# Patient Record
Sex: MALE | Race: WHITE | NOT HISPANIC OR LATINO | Employment: FULL TIME | URBAN - METROPOLITAN AREA
[De-identification: names, ages, dates, MRNs, and addresses within clinical notes are randomized per-mention and may not be internally consistent; named-entity substitution may affect disease eponyms.]

---

## 2017-01-04 ENCOUNTER — MYC REFILL (OUTPATIENT)
Dept: FAMILY MEDICINE | Facility: CLINIC | Age: 52
End: 2017-01-04

## 2017-01-04 DIAGNOSIS — R39.198 ABNORMALITY OF URINATION: ICD-10-CM

## 2017-01-04 RX ORDER — TAMSULOSIN HYDROCHLORIDE 0.4 MG/1
0.4 CAPSULE ORAL DAILY
Qty: 30 CAPSULE | Refills: 1 | Status: SHIPPED | OUTPATIENT
Start: 2017-01-04 | End: 2017-04-13

## 2017-01-04 NOTE — TELEPHONE ENCOUNTER
Prescription approved per [facility] Refill Protocol.    Mac Mccartney RN, BSN

## 2017-01-04 NOTE — TELEPHONE ENCOUNTER
Flomax         Last Written Prescription Date: 11/09/16  Last Fill Quantity: 30, # refills: 1    Last Office Visit with [facility], SAMANTHAP or [facility] prescribing provider:  11/09/16   Future Office Visit:    Next 5 appointments (look out 90 days)     Jan 05, 2017 10:45 AM   [facility] with [facility]ly [facility]ates [facility]   [facility]    [address] [place] [address] [phone]                  BP Readings from Last 3 Encounters:   11/09/16 122/84   10/25/16 112/80   09/15/15 113/71

## 2017-01-04 NOTE — TELEPHONE ENCOUNTER
Message from [employer]:  Original authorizing provider: Kadeem You MD, MD Kaz Moralez would like a refill of the following medications:  tamsulosin (FLOMAX) 0.4 MG 24 hr capsule [Kadeem You MD, MD]    Preferred pharmacy: [facility] [address] [place] [address]    Comment:  Dr. You only gave me a 2 month supply and I am nearly out. Please renew the prescription and if possible set it up so I can order it in 3 month supply (it's less expensive at the pharmacy that way.) Thank you

## 2017-01-05 ENCOUNTER — OFFICE VISIT (OUTPATIENT)
Dept: DERMATOLOGY | Facility: CLINIC | Age: 52
End: 2017-01-05
Payer: COMMERCIAL

## 2017-01-05 DIAGNOSIS — Z80.8 FAMILY HISTORY OF MELANOMA: ICD-10-CM

## 2017-01-05 DIAGNOSIS — B07.0 PLANTAR WART: ICD-10-CM

## 2017-01-05 DIAGNOSIS — L57.0 ACTINIC KERATOSIS: ICD-10-CM

## 2017-01-05 DIAGNOSIS — Z85.828 HISTORY OF NONMELANOMA SKIN CANCER: Primary | ICD-10-CM

## 2017-01-05 PROCEDURE — 17003 DESTRUCT PREMALG LES 2-14: CPT | Performed by: DERMATOLOGY

## 2017-01-05 PROCEDURE — 17110 DESTRUCTION B9 LES UP TO 14: CPT | Performed by: DERMATOLOGY

## 2017-01-05 PROCEDURE — 99213 OFFICE O/P EST LOW 20 MIN: CPT | Mod: 25 | Performed by: DERMATOLOGY

## 2017-01-05 PROCEDURE — 17000 DESTRUCT PREMALG LESION: CPT | Mod: 59 | Performed by: DERMATOLOGY

## 2017-01-05 NOTE — PATIENT INSTRUCTIONS
Cryotherapy    What is it?    Use of a very cold liquid, such as liquid nitrogen, to freeze and destroy abnormal skin cells that need to be removed    What should I expect?    Tenderness and redness    A small blister that might grow and fill with dark purple blood. There may be crusting.    More than one treatment may be needed if the lesions do not go away.    How do I care for the treated area?    Gently wash the area with your hands when bathing.    Use a thin layer of Vaseline to help with healing. You may use a Band-Aid.     The area should heal within 7-10 days and may leave behind a pink or lighter color.     Do not use an antibiotic or Neosporin ointment.     You may take acetaminophen (Tylenol) for pain.     Call your Doctor if you have:    Severe pain    Signs of infection (warmth, redness, cloudy yellow drainage, and or a bad smell)    Questions or concerns    Who should I call with questions?       [facility]: [phone]       [facility]: [phone]       For urgent needs outside of business hours call the [facility] at [phone]        and ask for the dermatology resident on call    Topical Salicylic Acid Treatment    What is this medicine used for?    Treatment of warts at home    Brand name and generic salicylic acid products are available over the counter    The products are available as liquids or sticky patches    Some brand names are: Duofilm , Mediplast , Dr. Priscilla newby , and Occlusol      When can I start using topical salicylic acid?    If you had treatment of your warts in clinic today wait about 1 week. The irritation or soreness will be gone.    Use the salicylic acid today if your warts were not treated in clinic.    How do I use topical salicylic acid?    Soak warts in warm water for 5 to 10 minutes. You may use your daily shower to do this.    Pat the area dry with a towel.    You need to remove the dead skin  over the warts. Gently use a pumice stone or emery board to remove the dead skin. Do not do this to the point of discomfort or bleeding    Note: Do not use the emery board or pumice stone for anything else. There is a chance of spreading the virus that causes warts.     Put 1 drop of the liquid or place a patch on each wart. Try not to get the medicine on the surrounding skin. Cover the treated areas with strong tape, or you can use Dr. Priscilla newby  or other brand moleskin    Soak, remove dead skin, and apply the salicylic acid each day    If you have skin irritation, skip days in between treatments until the irritation resolves.  Then start the treatment again.

## 2017-01-05 NOTE — NURSING NOTE
Dermatology Rooming Note    Kaz Moralez's goals for this visit include:   Chief Complaint   Patient presents with     Derm Problem     skin recheck. has some rough patches on his temple area.        Is a scribe okay for this visit:YES    Are records needed for this visit(If yes, obtain release of information): YES     Vitals: There were no vitals taken for this visit.    Referring Provider:  No referring provider defined for this encounter.

## 2017-01-05 NOTE — MR AVS SNAPSHOT
After Visit Summary   1/5/2017    Kaz Moralez    MRN: [id number]           Patient Information     Date Of Birth          1965        Visit Information        Provider Department      1/5/2017 10:45 AM Ashely Yates MD [facility]        Today's Diagnoses     History of nonmelanoma skin cancer    -  1     Actinic keratosis         Family history of melanoma         Plantar wart           Care Instructions    Cryotherapy    What is it?    Use of a very cold liquid, such as liquid nitrogen, to freeze and destroy abnormal skin cells that need to be removed    What should I expect?    Tenderness and redness    A small blister that might grow and fill with dark purple blood. There may be crusting.    More than one treatment may be needed if the lesions do not go away.    How do I care for the treated area?    Gently wash the area with your hands when bathing.    Use a thin layer of Vaseline to help with healing. You may use a Band-Aid.     The area should heal within 7-10 days and may leave behind a pink or lighter color.     Do not use an antibiotic or Neosporin ointment.     You may take acetaminophen (Tylenol) for pain.     Call your Doctor if you have:    Severe pain    Signs of infection (warmth, redness, cloudy yellow drainage, and or a bad smell)    Questions or concerns    Who should I call with questions?       [facility]: [phone]       [facility]: [phone]       For urgent needs outside of business hours call the [facility] at [phone]        and ask for the dermatology resident on call    Topical Salicylic Acid Treatment    What is this medicine used for?    Treatment of warts at home    Brand name and generic salicylic acid products are available over the counter    The products are available as liquids or sticky patches    Some brand names are: Duofilm , Mediplast , Dr. Priscilla newby , and  Occlusol      When can I start using topical salicylic acid?    If you had treatment of your warts in clinic today wait about 1 week. The irritation or soreness will be gone.    Use the salicylic acid today if your warts were not treated in clinic.    How do I use topical salicylic acid?    Soak warts in warm water for 5 to 10 minutes. You may use your daily shower to do this.    Pat the area dry with a towel.    You need to remove the dead skin over the warts. Gently use a pumice stone or emery board to remove the dead skin. Do not do this to the point of discomfort or bleeding    Note: Do not use the emery board or pumice stone for anything else. There is a chance of spreading the virus that causes warts.     Put 1 drop of the liquid or place a patch on each wart. Try not to get the medicine on the surrounding skin. Cover the treated areas with strong tape, or you can use Dr. Priscilla newby  or other brand moleskin    Soak, remove dead skin, and apply the salicylic acid each day    If you have skin irritation, skip days in between treatments until the irritation resolves.  Then start the treatment again.            Follow-ups after your visit        Your next 10 appointments already scheduled     Jan 05, 2018  2:15 PM   Return Visit with Ashely Yates MD   [facility]    [address] [place] [address] [phone]              Who to contact     If you have questions or need follow up information about today's clinic visit or your schedule please contact [facility] directly at [phone].  Normal or non-critical lab and imaging results will be communicated to you by MyChart, letter or phone within 4 business days after the clinic has received the results. If you do not hear from us within 7 days, please contact the clinic through MyChart or phone. If you have a critical or abnormal lab result, we will notify you by phone as soon as  possible.  Submit refill requests through [employer] or call your pharmacy and they will forward the refill request to us. Please allow 3 business days for your refill to be completed.          Additional Information About Your Visit        [employer] Information     [employer] gives you secure access to your electronic health record. If you see a primary care provider, you can also send messages to your care team and make appointments. If you have questions, please call your primary care clinic.  If you do not have a primary care provider, please call [phone] and they will assist you.      [employer] is an electronic gateway that provides easy, online access to your medical records. With [employer], you can request a clinic appointment, read your test results, renew a prescription or communicate with your care team.     To access your existing account, please contact your [facility] or call [phone] for assistance.        Care EveryWhere ID     This is your Care EveryWhere ID. This could be used by other organizations to access your [place] medical records  [id number]         Blood Pressure from Last 3 Encounters:   11/09/16 122/84   10/25/16 112/80   09/15/15 113/71    Weight from Last 3 Encounters:   11/09/16 193 lb (87.544 kg)   10/25/16 193 lb (87.544 kg)   07/06/15 193 lb (87.544 kg)              We Performed the Following     DESTRUCT BENIGN LESION, UP TO 14     DESTRUCT PREMALIGNANT LESION, 2-14     DESTRUCT PREMALIGNANT LESION, FIRST        Primary Care Provider    None Doctor, MD       No address on file        Thank you!     Thank you for choosing [facility]  for your care. Our goal is always to provide you with excellent care. Hearing back from our patients is one way we can continue to improve our services. Please take a few minutes to complete the written survey that you may receive in the mail after your visit with us. Thank you!             Your Updated  Medication List - Protect others around you: Learn how to safely use, store and throw away your medicines at www.disposemymeds.org.          This list is accurate as of: 1/5/17 11:14 AM.  Always use your most recent med list.                   Brand Name Dispense Instructions for use    GLUCOSAMINE CHONDROITIN JOINT Tabs      Take by mouth daily       IBUPROFEN PO      Take 400 mg by mouth       tamsulosin 0.4 MG capsule    FLOMAX    30 capsule    Take 1 capsule (0.4 mg) by mouth daily       timolol 0.25 % ophthalmic solution    TIMOPTIC     Place 1 drop into both eyes every morning

## 2017-01-05 NOTE — PROGRESS NOTES
"[facility] Dermatology Note      Dermatology Problem List:  1. Family history of melanoma  2, BCC, right posterior shoulder  -s/p ED&C 9/15/2015  3. Actinic keratosis  -s/p cryotherapy    Encounter Date: Jan 5, 2017    CC:  Chief Complaint   Patient presents with     Derm Problem     skin recheck. has some rough patches on his temple area.          History of Present Illness:  Mr. Kaz Moralez is a 51 year old male who presents as a follow-up for history of BCC and family history of melanoma. The patient was last seen 3/3/2016 when 6 AKs were treated with cryotherpay. Today, the patient reports a wart on the foot that he has had for a few months. He believes he picked it up after being barefoot in karate class. He has been using oregano oil on the area. History of pain in the area. Also reports a scaly area on the left temple. The patient reports no other lesions of concern.      Past Medical History:   Patient Active Problem List   Diagnosis     CARDIOVASCULAR SCREENING; LDL GOAL LESS THAN 160     Glaucoma     Past Medical History   Diagnosis Date     Unspecified glaucoma(365.9)      Glaucoma     No past surgical history on file.    Social History:  The patient works as an .     Family History:  The patient reports a family history of melanoma and nonmelanoma skin cancer.    Medications:  Current Outpatient Prescriptions   Medication Sig Dispense Refill     tamsulosin (FLOMAX) 0.4 MG capsule Take 1 capsule (0.4 mg) by mouth daily 30 capsule 1     Glucos-Chondroit-Hyaluron-MSM (GLUCOSAMINE CHONDROITIN JOINT) TABS Take by mouth daily       IBUPROFEN PO Take 400 mg by mouth       timolol (TIMOPTIC) 0.25 % ophthalmic solution Place 1 drop into both eyes every morning  0     Allergies   Allergen Reactions     Penicillins      \"Reaction when I had the mumps.\" Reports paralysis on R-side of body, \"couldn't walk for a week.\" Reports that \"to be safe\" doctor advised to avoid in future. "     Review of Systems:  -Skin: As above in HPI. No additional skin concerns.  -Const: The patient is generally feeling well today.     Physical exam:  There were no vitals taken for this visit.  GEN: This is a well developed, well-nourished male in no acute distress, in a pleasant mood.    SKIN: Total skin excluding the undergarment areas was performed. The exam included the head/face, neck, both arms, chest, back, abdomen, both legs, digits and/or nails. Including exam of the buttocks. Declines genital exam.   -There is a well healed surgical scar without erythema, nodularity or telangiectasias on the right posterior shoudler.   -There are erythematous macules with overyling adherent scale on the bilateral gemples.   -There is one  verrucous papule with thrombosed capillaries interrupting dermatoglyphics on the right plantar foot.3mmm  -No other lesions of concern on areas examined.     Impression/Plan:  1. Family history of melanoma  2. History of nonmelanoma skin cancer, no clincial evidence of recurrence:  Recommend sunscreens SPF #30 or greater, protective clothing and avoidance of tanning beds.  3. Actinic keratosis, bilateral temples  Cryotherapy procedure note: After verbal consent and discussion of risks and benefits including but no limited to dyspigmentation/scar, blister, and pain, 4 were treated with 1-2mm freeze border for 1 cycle with liquid nitrogen. Post cryotherapy instructions were provided.  4. Verruca plantaris, right plantar foot. History of pain.     Cryotherapy procedure note: After verbal consent and discussion of risks and benefits including but no limited to dyspigmentation/scar, blister, and pain, 1 was pared with a 15 blade then treated with 1-2mm freeze border for 2 cycles with liquid nitrogen. Post cryotherapy instructions were provided.     Wait 1 week, then may begin OTC treatments. Handout provided to patient.     Follow up in 1 year for skin check, 1 month for wart, earlier for new  or changing lesions.     Staff Involved:  Scribe/Staff    Scribe Disclosure:   I, Razia Moreland, am serving as a scribe to document services personally performed by Dr. Ashely Yates, based on data collection and the provider's statements to me.     Provider Disclosure:   I agree with above History, Review of Systems, Physical exam and Plan. I have reviewed the content of the documentation and have edited it as needed. I have personally performed the services documented here and the documentation accurately represents those services and the decisions I have made.     Ashely Yates MD    Department of Dermatology  [facility]: Phone: [phone], Fax:[phone]  [facility]: Phone: [phone], Fax: [phone]

## 2017-04-13 ENCOUNTER — MYC MEDICAL ADVICE (OUTPATIENT)
Dept: FAMILY MEDICINE | Facility: CLINIC | Age: 52
End: 2017-04-13

## 2017-04-13 DIAGNOSIS — R39.198 ABNORMALITY OF URINATION: ICD-10-CM

## 2017-04-13 RX ORDER — TAMSULOSIN HYDROCHLORIDE 0.4 MG/1
0.4 CAPSULE ORAL DAILY
Qty: 90 CAPSULE | Refills: 3 | Status: SHIPPED | OUTPATIENT
Start: 2017-04-13 | End: 2018-07-09

## 2017-05-04 ENCOUNTER — OFFICE VISIT (OUTPATIENT)
Dept: FAMILY MEDICINE | Facility: CLINIC | Age: 52
End: 2017-05-04
Payer: COMMERCIAL

## 2017-05-04 VITALS
WEIGHT: 194.5 LBS | BODY MASS INDEX: 27.23 KG/M2 | OXYGEN SATURATION: 99 % | HEART RATE: 74 BPM | HEIGHT: 71 IN | SYSTOLIC BLOOD PRESSURE: 124 MMHG | TEMPERATURE: 97.7 F | RESPIRATION RATE: 14 BRPM | DIASTOLIC BLOOD PRESSURE: 78 MMHG

## 2017-05-04 DIAGNOSIS — Z11.59 NEED FOR HEPATITIS C SCREENING TEST: ICD-10-CM

## 2017-05-04 DIAGNOSIS — Z13.6 CARDIOVASCULAR SCREENING; LDL GOAL LESS THAN 160: ICD-10-CM

## 2017-05-04 DIAGNOSIS — Z00.00 ROUTINE GENERAL MEDICAL EXAMINATION AT A HEALTH CARE FACILITY: Primary | ICD-10-CM

## 2017-05-04 DIAGNOSIS — M25.511 RIGHT SHOULDER PAIN, UNSPECIFIED CHRONICITY: ICD-10-CM

## 2017-05-04 LAB
CHOLEST SERPL-MCNC: 188 MG/DL
HCV AB SERPL QL IA: NORMAL
HDLC SERPL-MCNC: 47 MG/DL
LDLC SERPL CALC-MCNC: 116 MG/DL
NONHDLC SERPL-MCNC: 141 MG/DL
TRIGL SERPL-MCNC: 126 MG/DL

## 2017-05-04 PROCEDURE — 86803 HEPATITIS C AB TEST: CPT | Performed by: FAMILY MEDICINE

## 2017-05-04 PROCEDURE — 80061 LIPID PANEL: CPT | Performed by: FAMILY MEDICINE

## 2017-05-04 PROCEDURE — 36415 COLL VENOUS BLD VENIPUNCTURE: CPT | Performed by: FAMILY MEDICINE

## 2017-05-04 PROCEDURE — 99396 PREV VISIT EST AGE 40-64: CPT | Performed by: FAMILY MEDICINE

## 2017-05-04 ASSESSMENT — PAIN SCALES - GENERAL: PAINLEVEL: NO PAIN (0)

## 2017-05-04 NOTE — NURSING NOTE
"Chief Complaint   Patient presents with     Physical       Initial /78 (BP Location: Right arm, Patient Position: Chair, Cuff Size: Adult Regular)  Pulse 74  Temp 97.7  F (36.5  C) (Tympanic)  Resp 14  Ht 5' 11\" (1.803 m)  Wt 194 lb 8 oz (88.2 kg)  SpO2 99%  BMI 27.13 kg/m2 Estimated body mass index is 27.13 kg/(m^2) as calculated from the following:    Height as of this encounter: 5' 11\" (1.803 m).    Weight as of this encounter: 194 lb 8 oz (88.2 kg).  Medication Reconciliation: complete   Health Maintenance Due   Topic Date Due     HEPATITIS C SCREENING  05/24/1981     Aniya Del Valle, [facility]      "

## 2017-05-04 NOTE — MR AVS SNAPSHOT
After Visit Summary   5/4/2017    Kaz Moralez    MRN: [id number]           Patient Information     Date Of Birth          1965        Visit Information        Provider Department      5/4/2017 8:20 AM Kadeem You MD [facility]        Today's Diagnoses     Routine general medical examination at a health care facility    -  1    Need for hepatitis C screening test        CARDIOVASCULAR SCREENING; LDL GOAL LESS THAN 160        Right shoulder pain, unspecified chronicity          Care Instructions      Preventive Health Recommendations  Male Ages 50 - 64    Yearly exam:             See your health care provider every year in order to  o   Review health changes.   o   Discuss preventive care.    o   Review your medicines if your doctor has prescribed any.     Have a cholesterol test every 5 years, or more frequently if you are at risk for high cholesterol/heart disease.     Have a diabetes test (fasting glucose) every three years. If you are at risk for diabetes, you should have this test more often.     Have a colonoscopy at age 50, or have a yearly FIT test (stool test). These exams will check for colon cancer.      Talk with your health care provider about whether or not a prostate cancer screening test (PSA) is right for you.    You should be tested each year for STDs (sexually transmitted diseases), if you re at risk.     Shots: Get a flu shot each year. Get a tetanus shot every 10 years.     Nutrition:    Eat at least 5 servings of fruits and vegetables daily.     Eat whole-grain bread, whole-wheat pasta and brown rice instead of white grains and rice.     Talk to your provider about Calcium and Vitamin D.     Lifestyle    Exercise for at least 150 minutes a week (30 minutes a day, 5 days a week). This will help you control your weight and prevent disease.     Limit alcohol to one drink per day.     No smoking.     Wear sunscreen to prevent skin cancer.     See your  dentist every six months for an exam and cleaning.     See your eye doctor every 1 to 2 years.          Follow-ups after your visit        Additional Services     ORTHOPEDICS ADULT REFERRAL       Your provider has referred you to: [facility]: [facility] [phone]   [email or web address]    Please be aware that coverage of these services is subject to the terms and limitations of your health insurance plan.  Call member services at your health plan with any benefit or coverage questions.      Please bring the following to your appointment:    >>   Any x-rays, CTs or MRIs which have been performed.  Contact the facility where they were done to arrange for  prior to your scheduled appointment.    >>   List of current medications   >>   This referral request   >>   Any documents/labs given to you for this referral                  Your next 10 appointments already scheduled     May 08, 2017  9:30 AM CDT   New Visit with Esmer Person MD   [facility]    [address] [facility] [address] [phone]            Jan 05, 2018  2:15 PM CST   [facility]    [address] [place] [address] [phone]              Who to contact     If you have questions or need follow up information about today's clinic visit or your schedule please contact [facility] directly at [phone].  Normal or non-critical lab and imaging results will be communicated to you by MyChart, letter or phone within 4 business days after the clinic has received the results. If you do not hear from us within 7 days, please contact the clinic through MyChart or phone. If you have a critical or abnormal lab result, we will notify you by phone as soon as possible.  Submit refill requests through [employer] or call your  "pharmacy and they will forward the refill request to us. Please allow 3 business days for your refill to be completed.          Additional Information About Your Visit        [employer] Information     [employer] gives you secure access to your electronic health record. If you see a primary care provider, you can also send messages to your care team and make appointments. If you have questions, please call your primary care clinic.  If you do not have a primary care provider, please call [phone] and they will assist you.        Care EveryWhere ID     This is your Care EveryWhere ID. This could be used by other organizations to access your [place] medical records  [id number]        Your Vitals Were     Pulse Temperature Respirations Height Pulse Oximetry BMI (Body Mass Index)    74 97.7  F (36.5  C) (Tympanic) 14 5' 11\" (1.803 m) 99% 27.13 kg/m2       Blood Pressure from Last 3 Encounters:   05/04/17 124/78   11/09/16 122/84   10/25/16 112/80    Weight from Last 3 Encounters:   05/04/17 194 lb 8 oz (88.2 kg)   11/09/16 193 lb (87.5 kg)   10/25/16 193 lb (87.5 kg)              We Performed the Following     Hepatitis C Screen Reflex to HCV RNA Quant and Genotype     LIPID REFLEX TO DIRECT LDL PANEL     ORTHOPEDICS ADULT REFERRAL        Primary Care Provider    None Doctor, MD       No address on file        Thank you!     Thank you for choosing [facility]  for your care. Our goal is always to provide you with excellent care. Hearing back from our patients is one way we can continue to improve our services. Please take a few minutes to complete the written survey that you may receive in the mail after your visit with us. Thank you!             Your Updated Medication List - Protect others around you: Learn how to safely use, store and throw away your medicines at www.disposemymeds.org.          This list is accurate as of: 5/4/17  8:53 AM.  Always use your most recent med list.                   Brand " Name Dispense Instructions for use    GLUCOSAMINE CHONDROITIN JOINT Tabs      Take by mouth daily       IBUPROFEN PO      Take 400 mg by mouth       tamsulosin 0.4 MG capsule    FLOMAX    90 capsule    Take 1 capsule (0.4 mg) by mouth daily       timolol 0.25 % ophthalmic solution    TIMOPTIC     Place 1 drop into both eyes every morning

## 2017-05-04 NOTE — PROGRESS NOTES
SUBJECTIVE:     CC: Kaz Moralez is an 51 year old male who presents for preventative health visit.     Healthy Habits:    Do you get at least three servings of calcium containing foods daily (dairy, green leafy vegetables, etc.)? yes    Amount of exercise or daily activities, outside of work: 7 day(s) per week, 60+ min per day    Problems taking medications regularly No    Medication side effects: Yes - Timilol (eye drops)  causes intense dreams    Have you had an eye exam in the past two years? yes    Do you see a dentist twice per year? yes    Do you have sleep apnea, excessive snoring or daytime drowsiness?some daytime drowsiness, he has a deviated septum        He had a rt shoulder injury y-16 and is having recurring pain.   He wants to address his extra 20 lbs.     Today's PHQ-2 Score:   PHQ-2 ( 1999 [employer]) 6/26/2015   Q1: Little interest or pleasure in doing things 0   Q2: Feeling down, depressed or hopeless 0   PHQ-2 Score 0       Abuse: Current or Past(Physical, Sexual or Emotional)- No  Do you feel safe in your environment - Yes    Social History   Substance Use Topics     Smoking status: Never Smoker     Smokeless tobacco: Never Used     Alcohol use 1.2 oz/week     2 Standard drinks or equivalent per week      Comment: wine 2 x week     The patient does not drink >3 drinks per day nor >7 drinks per week.    Last PSA:   PSA   Date Value Ref Range Status   11/09/2016 2.07 0 - 4 ug/L Final     Comment:     Assay Method:  Chemiluminescence using Siemens Vista analyzer       Recent Labs   Lab Test  06/26/15   1001  03/29/11   0827   CHOL  170  172   HDL  40*  37*   LDL  108  106   TRIG  109  145   CHOLHDLRATIO  4.2  4.7       Reviewed orders with patient. Reviewed health maintenance and updated orders accordingly - Yes    Reviewed and updated as needed this visit by clinical staff         Reviewed and updated as needed this visit by Provider            ROS:  C: NEGATIVE for fever, chills, change in  "weight  I: NEGATIVE for worrisome rashes, moles or lesions  E: NEGATIVE for vision changes or irritation  ENT: NEGATIVE for ear, mouth and throat problems  R: NEGATIVE for significant cough or SOB  CV: NEGATIVE for chest pain, palpitations or peripheral edema  GI: NEGATIVE for nausea, abdominal pain, heartburn, or change in bowel habits   male: negative for dysuria, hematuria, decreased urinary stream, erectile dysfunction, urethral discharge  MUSCULOSKELETAL:R shoulder pain now for many years getting worse remembers an acute injury sudden pain when trying to bench press 300 lbs.      N: NEGATIVE for weakness, dizziness or paresthesias  P: NEGATIVE for changes in mood or affect    BP Readings from Last 3 Encounters:   05/04/17 124/78   11/09/16 122/84   10/25/16 112/80    Wt Readings from Last 3 Encounters:   05/04/17 194 lb 8 oz (88.2 kg)   11/09/16 193 lb (87.5 kg)   10/25/16 193 lb (87.5 kg)                  Current Outpatient Prescriptions   Medication Sig Dispense Refill     tamsulosin (FLOMAX) 0.4 MG capsule Take 1 capsule (0.4 mg) by mouth daily 90 capsule 3     Glucos-Chondroit-Hyaluron-MSM (GLUCOSAMINE CHONDROITIN JOINT) TABS Take by mouth daily       IBUPROFEN PO Take 400 mg by mouth       timolol (TIMOPTIC) 0.25 % ophthalmic solution Place 1 drop into both eyes every morning  0     OBJECTIVE:     /78 (BP Location: Right arm, Patient Position: Chair, Cuff Size: Adult Regular)  Pulse 74  Temp 97.7  F (36.5  C) (Tympanic)  Resp 14  Ht 5' 11\" (1.803 m)  Wt 194 lb 8 oz (88.2 kg)  SpO2 99%  BMI 27.13 kg/m2  EXAM:  GENERAL: healthy, alert and no distress  NECK: no adenopathy, no asymmetry, masses, or scars and thyroid normal to palpation  RESP: lungs clear to auscultation - no rales, rhonchi or wheezes  CV: regular rate and rhythm, normal S1 S2, no S3 or S4, no murmur, click or rub, no peripheral edema and peripheral pulses strong  ABDOMEN: soft, nontender, no hepatosplenomegaly, no masses and " "bowel sounds normal  MS: RUE exam shows pain with abduction to the side at 90 degrees with any pronation or supination, intense pain deep in the shoulder.    NEURO: Normal strength and tone, mentation intact and speech normal  PSYCH: mentation appears normal, affect normal/bright    ASSESSMENT/PLAN:     1. Routine general medical examination at a health care facility      2. Need for hepatitis C screening test    - Hepatitis C Screen Reflex to HCV RNA Quant and Genotype    3. CARDIOVASCULAR SCREENING; LDL GOAL LESS THAN 160    - LIPID REFLEX TO DIRECT LDL PANEL    4. Right shoulder pain, unspecified chronicity    - ORTHOPEDICS ADULT REFERRAL    COUNSELING:  Reviewed preventive health counseling, as reflected in patient instructions       Regular exercise       Healthy diet/nutrition         reports that he has never smoked. He has never used smokeless tobacco.    Estimated body mass index is 26.99 kg/(m^2) as calculated from the following:    Height as of 11/9/16: 5' 10.9\" (1.801 m).    Weight as of 11/9/16: 193 lb (87.5 kg).       Counseling Resources:  ATP IV Guidelines  Pooled Cohorts Equation Calculator  FRAX Risk Assessment  ICSI Preventive Guidelines  Dietary Guidelines for Americans, 2010  USDA's MyPlate  ASA Prophylaxis  Lung CA Screening    Kadeem You MD  [facility]  "

## 2017-05-08 ENCOUNTER — RADIANT APPOINTMENT (OUTPATIENT)
Dept: GENERAL RADIOLOGY | Facility: CLINIC | Age: 52
End: 2017-05-08
Attending: ORTHOPAEDIC SURGERY
Payer: COMMERCIAL

## 2017-05-08 ENCOUNTER — OFFICE VISIT (OUTPATIENT)
Dept: ORTHOPEDICS | Facility: CLINIC | Age: 52
End: 2017-05-08
Payer: COMMERCIAL

## 2017-05-08 VITALS — TEMPERATURE: 97.2 F | HEIGHT: 71 IN | WEIGHT: 194 LBS | BODY MASS INDEX: 27.16 KG/M2

## 2017-05-08 DIAGNOSIS — M75.31 CALCIFIC TENDINITIS OF RIGHT SHOULDER: Primary | ICD-10-CM

## 2017-05-08 DIAGNOSIS — M25.519 SHOULDER PAIN: ICD-10-CM

## 2017-05-08 PROCEDURE — 99244 OFF/OP CNSLTJ NEW/EST MOD 40: CPT | Mod: 25 | Performed by: ORTHOPAEDIC SURGERY

## 2017-05-08 PROCEDURE — 20610 DRAIN/INJ JOINT/BURSA W/O US: CPT | Mod: RT | Performed by: ORTHOPAEDIC SURGERY

## 2017-05-08 PROCEDURE — 73030 X-RAY EXAM OF SHOULDER: CPT | Mod: TC

## 2017-05-08 NOTE — NURSING NOTE
"Chief Complaint   Patient presents with     Consult     rt shoulder pain for about 18 years        Initial Temp 97.2  F (36.2  C)  Ht 1.803 m (5' 11\")  Wt 88 kg (194 lb)  BMI 27.06 kg/m2 Estimated body mass index is 27.06 kg/(m^2) as calculated from the following:    Height as of this encounter: 1.803 m (5' 11\").    Weight as of this encounter: 88 kg (194 lb).  Medication Reconciliation: complete    BP completed using cuff size: NA (Not Taken)    Kristy Lakhani MA      "

## 2017-05-08 NOTE — PATIENT INSTRUCTIONS
Encounter Diagnoses   Name Primary?     Shoulder pain Yes     Calcific tendinitis of right shoulder      Rest, ice and elevate above heart level as needed for pain control  1. You have a calcium deposit that is quite large in the rotator cuff tendon.    2. You are compinsating very well.  3. The injections of cortisone seem to help absorb the calcium.  We might have to do a few of these.  We decided to to this today.  4. We are not sure why this happens, there is not a specific reason for the calcium settling there.  5. You can followup with Esmer Person MD in 6 weeks, if you are having pain at that time we can do a cortisone injection.  You can always cancel the appointment if you are doing really well and follow-up as needed.   Cortisone Instructions:     1. You received an injection of cortisone into your R shoulder today.  2. The joint(s) may be more painful for the first 1-2 days.  3. We ask you to continue to rest the joint(s) for a few more days before resuming regular activities.  4. Pain Medications you can take (as long as your primary care provider allows these meds and you do not have kidney or liver conditions):  Tylenol  Take 1000 mg by mouth every 6 hours as needed; maximum dose 4000 mg a day  Ibuprofen  600 mg every 6 hours as needed; maximum 2400 mg a day  (OK to take tylenol and ibuprofen at the same time)  5. Rest, ice and elevate as needed for pain control  6. Watch for these signs of infection: redness, swelling, drainage, warmth to touch, increased pain, or fever. Call the clinic or make an appointment to be seen if you think you have an infection.  7. If you are diabetic, make sure you keep a close eye on your blood sugars, they can get elevated with cortisone injections.   8. Sometimes it can take 1-2 weeks for it to reach its full effect.    Cortisone Injections  Cortisone is a type of steroid. It can greatly reduce swelling, redness, and irritation (inflammation) and pain. Being  injected with cortisone is simple and doesn t take long. Your doctor may ask you questions about your health. Certain health conditions, such as diabetes, can be affected by cortisone.     Your pain may be relieved by a cortisone injection.   Why have a cortisone injection?  Injecting cortisone can relieve pain for anything from a sports injury to arthritis. Your doctor may suggest an injection if rest, splints, or oral medicine doesn t relieve your pain. Injecting cortisone is simpler than having surgery. And cortisone may provide the lasting pain relief that can help you get out and enjoy life again.  Getting the injection  Your doctor will start by cleaning and occasionally numbing your skin at the injection site. Next you ll be injected with local anesthetics (for short-term pain relief) and cortisone. The injection may last a few moments. A small bandage will be put over the injection site. You ll then be ready to go home.  After your injection  After being injected, make sure you don t injure the treated region. But stay active. Enjoy a walk or some other mild activity. Just be careful not to strain the region that gave you trouble.  The next day  Some people feel more pain after being injected. This is normal, and it will go away soon. Applying ice for 20 minutes at a time to your injury may reduce the increased pain. Rest for the first day or two. You don t need to stay in bed. But avoid tasks that may strain the injured region.  If you have diabetes  Cortisone injections can cause blood sugar to be increased for several days after the injection. If you have diabetes, you should follow your blood  sugar closely during this time. Follow your regular plan for what to do when your blood sugar is elevated.     9097-8152 [employer]. [address] [place] [address], [place] [address]. All rights reserved. This information is not intended as a substitute for professional medical care. Always follow your  healthcare professional's instructions.    [employer] and [employer] may offer reliable information regarding your diagnosis and treatment plan.    THANK YOU for coming in today. If you receive a survey via [employer] or mail please let us know if there was anything you especially appreciated today or if there is any way we can improve our clinic. We appreciate your input.    GENERAL INFORMATION:  Our hours are:  Monday :     Clinic 7:30 AM-430 PM ([facility])  Tuesday:      Operating Room All Day ([facility])  Wednesday: Clinic 7:30 AM - 11:15 AM ([facility])             Clinic 1:00 PM - 4:00PM ([facility])  Thursday:     Administrative Day  Friday:          Clinic 7:30 AM - 11:15 AM ([facility])            Clinic 1:00 PM - 4:00 PM ([facility])    We are not in the office Thursdays. Therefore non- urgent calls and medical messages received on Thursday will be addressed when we are back in the office on Wednesday. Urgent matters will be reviewed and addressed by one of our partners in the office as needed.    If lab work was done today as part of your evaluation you will generally be contacted via [employer], mail, or phone with the results within 1-5 days. If there is an alarming result we will contact you by phone. Lab results come back at varying times, I generally wait until all labs are resulted before making comments on results. Please note labs are automatically released to [employer] (if you have signed up for it) once available-at times you may see these prior to my having a chance to review them as well.    If you need refills please contact your pharmacist. They will send a refill request to me to review. Please allow 3 business days for us to process all refill requests. All narcotic refills should be handled in the clinic at the time of your visit.

## 2017-05-08 NOTE — PROGRESS NOTES
ORTHOPEDIC CONSULT      Chief Complaint: Kaz Moralez is a 51 year old right hand dominant male who works as a . He enjoys archery, woodworking and martial arts. He just recently got his .    He is being seen for   Chief Complaints and History of Present Illnesses   Patient presents with     Consult     rt shoulder pain for about 18 years          History of Present Illness:   Kaz Moralez is a 51 year old male who is seen in consultation at the request of Dr. You  History of Present illness:  Kaz presents for evaluation of:  1.) Rt shoulder  Onset:  patient states approximately 18 years ago when he was bench pressing, he was injured at that time but never saw a doctor. He did follow the chiropractor in the shoulder seam to get slightly better. Patient most recently has acquired the ability to have insurance and afford having a possible surgery so that is what brings them in today.     Symptoms brought on by 90  of abduction and supination, pouring out of coffee pot.   Location:  rt shoulder.  anterior shoulder  Character:  pain with the above motion  Progression of symptoms:  not getting better.    Previous similar pain: no .   Pain Level:  0/10.   Previous treatments:  chiropractor.  Additional History: none    Patient's past medical, surgical, social and family histories reviewed.     Past Medical History:   Diagnosis Date     Unspecified glaucoma(365.9)     Glaucoma         No past surgical history on file.    Medications:    Current Outpatient Prescriptions on File Prior to Visit:  tamsulosin (FLOMAX) 0.4 MG capsule Take 1 capsule (0.4 mg) by mouth daily   Glucos-Chondroit-Hyaluron-MSM (GLUCOSAMINE CHONDROITIN JOINT) TABS Take by mouth daily   IBUPROFEN PO Take 400 mg by mouth   timolol (TIMOPTIC) 0.25 % ophthalmic solution Place 1 drop into both eyes every morning     No current facility-administered medications on file prior to visit.     Allergies   Allergen Reactions  "    Codeine Nausea and Vomiting     Morphine      Other reaction(s): Vomiting     Penicillins      \"Reaction when I had the mumps.\" Reports paralysis on R-side of body, \"couldn't walk for a week.\" Reports that \"to be safe\" doctor advised to avoid in future.     Seasonal Allergies        Social History     Occupational History     Not on file.     Social History Main Topics     Smoking status: Never Smoker     Smokeless tobacco: Never Used     Alcohol use 1.2 oz/week     2 Standard drinks or equivalent per week      Comment: wine 3 x month     Drug use: No     Sexual activity: Yes     Partners: Female       Family History   Problem Relation Age of Onset     Hypertension Mother      CANCER Mother      skin cancer-melanoma     Cardiovascular Father      Eye Disorder Father      glaucoma     DIABETES Brother      DIABETES Sister      DIABETES Sister        REVIEW OF SYSTEMS  10 point review systems performed otherwise negative as noted as per history of present illness.    Physical Exam:  Vitals: Temp 97.2  F (36.2  C)  Ht 1.803 m (5' 11\")  Wt 88 kg (194 lb)  BMI 27.06 kg/m2  BMI= Body mass index is 27.06 kg/(m^2).    Constitutional: healthy, alert and no acute distress   Psychiatric: mentation appears normal and affect normal/bright  NEURO: no focal deficits, CMS intact right upper extremity  RESP: Normal with easy respirations and no use of accessory muscles to breathe, no audible wheezing or retractions  CV: +2 radial pulse and hand is warm  SKIN: No erythema, rashes, excoriation, or breakdown. No evidence of infection.   MUSCULOSKELETAL:    INSPECTION of right shoulder: No gross deformities, erythema, edema, ecchymosis, atrophy or fasciculations.     PALPATION: No tenderness to palpation of the AC joint, proximal biceps tendon, clavicle, lateral shoulder, posterior shoulder, trapezius area. No increased warmth noted.     ROM: patient is able to forward flex and abduct 2 approximately 180  without any catching " ocular pain. Patient can externally rotate 2 approximately 95  and internally rotated to his thoracic spine without any catching ocular pain.     STRENGTH: 5 out of 5 , deltoid as well as internal and external rotation     SPECIAL TEST: Patient has a negative speed test, negative Cook sign, negative cross over test, negative belly press and negative liftoff test, very positive empty can and negative full can test, negative external rotator lag sign,  negative apprehension test.   GAIT: non-antalgic  Lymph: no palpable lymph nodes    Diagnostic Modalities:  today we reviewed the x-rays that were done 3 views of the right shoulder showing us a large calcific deposits posted insertion of the supraspinatus tendon on the AP view. No other fracture dislocation or tumor. No major degenerative joint disease.  Independent visualization of the images was performed.    Impression: 1. Right shoulder calcific tendinitis    Plan:  All of the above pertinent physical exam and imaging modalities findings was reviewed with Kaz.                                          INJECTION PROCEDURE:  The patient was counseled about an  injection, including discussion of risks (including infection), contents of the injection, rationale for performing the injection, and expected benefits of the injection. The skin was prepped with alcohol and betadine and then utilizing sterile technique an injection of the right shoulder subacromial space from the posterolateral approach  was performed. I used phyllis chloride spray prior to doing the injection. The injection consisted 1ml of Kenalog (40mg per 1ml) with 8ml 1% lidocaine plain. The patient tolerated the injection well, and there were no complications. The injection site was covered with a Band-Aid. The injection was performed by MEHUL Siddiqui     Patient Instructions:  1. You have a calcium deposit that is quite large in the rotator cuff tendon.    2. You are compinsating very well.  3.  The injections of cortisone seem to help absorb the calcium.  We might have to do a few of these.  We decided to to this today.  4. We are not sure why this happens, there is not a specific reason for the calcium settling there.  5. You can followup with Esmer Person MD in 6 weeks, if you are having pain at that time we can do a cortisone injection.  You can always cancel the appointment if you are doing really well and follow-up as needed.   Re-x-ray on return: yes, 3 views of the right shoulder, scapula y/grashy/axillary views.      Scribed by Frankie Gaytan PA-C on 5/8/2017 at 10:21 AM, based on Dr. Esmer Person's statements to me.    This note was dictated with [employer].    DIANE Howell MD

## 2017-05-08 NOTE — MR AVS SNAPSHOT
After Visit Summary   5/8/2017    Kaz Moralez    MRN: [id number]           Patient Information     Date Of Birth          1965        Visit Information        Provider Department      5/8/2017 9:30 AM Esmer Person MD [facility]        Today's Diagnoses     Shoulder pain    -  1    Calcific tendinitis of right shoulder          Care Instructions    Encounter Diagnoses   Name Primary?     Shoulder pain Yes     Calcific tendinitis of right shoulder      Rest, ice and elevate above heart level as needed for pain control  1. You have a calcium deposit that is quite large in the rotator cuff tendon.    2. You are compinsating very well.  3. The injections of cortisone seem to help absorb the calcium.  We might have to do a few of these.  We decided to to this today.  4. We are not sure why this happens, there is not a specific reason for the calcium settling there.  5. You can followup with Esmer Person MD in 6 weeks, if you are having pain at that time we can do a cortisone injection.  You can always cancel the appointment if you are doing really well and follow-up as needed.   Cortisone Instructions:     1. You received an injection of cortisone into your R shoulder today.  2. The joint(s) may be more painful for the first 1-2 days.  3. We ask you to continue to rest the joint(s) for a few more days before resuming regular activities.  4. Pain Medications you can take (as long as your primary care provider allows these meds and you do not have kidney or liver conditions):  Tylenol  Take 1000 mg by mouth every 6 hours as needed; maximum dose 4000 mg a day  Ibuprofen  600 mg every 6 hours as needed; maximum 2400 mg a day  (OK to take tylenol and ibuprofen at the same time)  5. Rest, ice and elevate as needed for pain control  6. Watch for these signs of infection: redness, swelling, drainage, warmth to touch, increased pain, or fever. Call the clinic or make an  appointment to be seen if you think you have an infection.  7. If you are diabetic, make sure you keep a close eye on your blood sugars, they can get elevated with cortisone injections.   8. Sometimes it can take 1-2 weeks for it to reach its full effect.    Cortisone Injections  Cortisone is a type of steroid. It can greatly reduce swelling, redness, and irritation (inflammation) and pain. Being injected with cortisone is simple and doesn t take long. Your doctor may ask you questions about your health. Certain health conditions, such as diabetes, can be affected by cortisone.     Your pain may be relieved by a cortisone injection.   Why have a cortisone injection?  Injecting cortisone can relieve pain for anything from a sports injury to arthritis. Your doctor may suggest an injection if rest, splints, or oral medicine doesn t relieve your pain. Injecting cortisone is simpler than having surgery. And cortisone may provide the lasting pain relief that can help you get out and enjoy life again.  Getting the injection  Your doctor will start by cleaning and occasionally numbing your skin at the injection site. Next you ll be injected with local anesthetics (for short-term pain relief) and cortisone. The injection may last a few moments. A small bandage will be put over the injection site. You ll then be ready to go home.  After your injection  After being injected, make sure you don t injure the treated region. But stay active. Enjoy a walk or some other mild activity. Just be careful not to strain the region that gave you trouble.  The next day  Some people feel more pain after being injected. This is normal, and it will go away soon. Applying ice for 20 minutes at a time to your injury may reduce the increased pain. Rest for the first day or two. You don t need to stay in bed. But avoid tasks that may strain the injured region.  If you have diabetes  Cortisone injections can cause blood sugar to be increased for  several days after the injection. If you have diabetes, you should follow your blood  sugar closely during this time. Follow your regular plan for what to do when your blood sugar is elevated.     4379-9789 [employer]. [address] [place] [address], [place] [address]. All rights reserved. This information is not intended as a substitute for professional medical care. Always follow your healthcare professional's instructions.    [employer] and [employer] may offer reliable information regarding your diagnosis and treatment plan.    THANK YOU for coming in today. If you receive a survey via [employer] or mail please let us know if there was anything you especially appreciated today or if there is any way we can improve our clinic. We appreciate your input.    GENERAL INFORMATION:  Our hours are:  Monday :     Clinic 7:30 AM-430 PM ([facility])  Tuesday:      Operating Room All Day ([facility])  Wednesday: Clinic 7:30 AM - 11:15 AM ([facility])             Clinic 1:00 PM - 4:00PM ([facility])  Thursday:     Administrative Day  Friday:          Clinic 7:30 AM - 11:15 AM ([facility])            Clinic 1:00 PM - 4:00 PM ([facility])    We are not in the office Thursdays. Therefore non- urgent calls and medical messages received on Thursday will be addressed when we are back in the office on Wednesday. Urgent matters will be reviewed and addressed by one of our partners in the office as needed.    If lab work was done today as part of your evaluation you will generally be contacted via [employer], mail, or phone with the results within 1-5 days. If there is an alarming result we will contact you by phone. Lab results come back at varying times, I generally wait until all labs are resulted before making comments on results. Please note labs are automatically released to [employer] (if you have  signed up for it) once available-at times you may see these prior to my having a chance to review them as well.    If you need refills please contact your pharmacist. They will send a refill request to me to review. Please allow 3 business days for us to process all refill requests. All narcotic refills should be handled in the clinic at the time of your visit.         Follow-ups after your visit        Your next 10 appointments already scheduled     Jan 05, 2018  2:15 PM CST   Return Visit with Ashely Yates MD   [facility]    [address] [place] [address] [phone]              Who to contact     If you have questions or need follow up information about today's clinic visit or your schedule please contact [facility] directly at [phone].  Normal or non-critical lab and imaging results will be communicated to you by MyChart, letter or phone within 4 business days after the clinic has received the results. If you do not hear from us within 7 days, please contact the clinic through MyChart or phone. If you have a critical or abnormal lab result, we will notify you by phone as soon as possible.  Submit refill requests through [employer] or call your pharmacy and they will forward the refill request to us. Please allow 3 business days for your refill to be completed.          Additional Information About Your Visit        MyChart Information     [employer] gives you secure access to your electronic health record. If you see a primary care provider, you can also send messages to your care team and make appointments. If you have questions, please call your primary care clinic.  If you do not have a primary care provider, please call [phone] and they will assist you.        Care EveryWhere ID     This is your Care EveryWhere ID. This could be used by other organizations to access your [facility] medical records  [id number]        Your  "Vitals Were     Temperature Height BMI (Body Mass Index)             97.2  F (36.2  C) 1.803 m (5' 11\") 27.06 kg/m2          Blood Pressure from Last 3 Encounters:   05/04/17 124/78   11/09/16 122/84   10/25/16 112/80    Weight from Last 3 Encounters:   05/08/17 88 kg (194 lb)   05/04/17 88.2 kg (194 lb 8 oz)   11/09/16 87.5 kg (193 lb)               Primary Care Provider    None Doctor, MD       No address on file        Thank you!     Thank you for choosing [facility]  for your care. Our goal is always to provide you with excellent care. Hearing back from our patients is one way we can continue to improve our services. Please take a few minutes to complete the written survey that you may receive in the mail after your visit with us. Thank you!             Your Updated Medication List - Protect others around you: Learn how to safely use, store and throw away your medicines at www.disposemymeds.org.          This list is accurate as of: 5/8/17 10:05 AM.  Always use your most recent med list.                   Brand Name Dispense Instructions for use    GLUCOSAMINE CHONDROITIN JOINT Tabs      Take by mouth daily       IBUPROFEN PO      Take 400 mg by mouth       tamsulosin 0.4 MG capsule    FLOMAX    90 capsule    Take 1 capsule (0.4 mg) by mouth daily       timolol 0.25 % ophthalmic solution    TIMOPTIC     Place 1 drop into both eyes every morning         "

## 2017-05-08 NOTE — Clinical Note
5/8/2017       RE: Kaz Moralez  [address] [facility] [address]           Dear Colleague,    Thank you for referring your patient, Kaz Moralez, to the [facility]. Please see a copy of my visit note below.    ORTHOPEDIC CONSULT      Chief Complaint: Kaz Moralez is a 51 year old right hand dominant male who works as a . He enjoys archery, woodworking and martial arts. He just recently got his .    He is being seen for   Chief Complaints and History of Present Illnesses   Patient presents with     Consult     rt shoulder pain for about 18 years          History of Present Illness:   Kaz Moralez is a 51 year old male who is seen in consultation at the request of Dr. You  History of Present illness:  Kaz presents for evaluation of:  1.) Rt shoulder  Onset:  patient states approximately 18 years ago when he was bench pressing, he was injured at that time but never saw a doctor. He did follow the chiropractor in the shoulder seam to get slightly better. Patient most recently has acquired the ability to have insurance and afford having a possible surgery so that is what brings them in today.     Symptoms brought on by 90  of abduction and supination, pouring out of coffee pot.   Location:  rt shoulder.  anterior shoulder  Character:  pain with the above motion  Progression of symptoms:  not getting better.    Previous similar pain: no .   Pain Level:  0/10.   Previous treatments:  chiropractor.  Additional History: none    Patient's past medical, surgical, social and family histories reviewed.     Past Medical History:   Diagnosis Date     Unspecified glaucoma(365.9)     Glaucoma         No past surgical history on file.    Medications:    Current Outpatient Prescriptions on File Prior to Visit:  tamsulosin (FLOMAX) 0.4 MG capsule Take 1 capsule (0.4 mg) by mouth daily   Glucos-Chondroit-Hyaluron-MSM (GLUCOSAMINE CHONDROITIN JOINT) TABS Take by  "mouth daily   IBUPROFEN PO Take 400 mg by mouth   timolol (TIMOPTIC) 0.25 % ophthalmic solution Place 1 drop into both eyes every morning     No current facility-administered medications on file prior to visit.     Allergies   Allergen Reactions     Codeine Nausea and Vomiting     Morphine      Other reaction(s): Vomiting     Penicillins      \"Reaction when I had the mumps.\" Reports paralysis on R-side of body, \"couldn't walk for a week.\" Reports that \"to be safe\" doctor advised to avoid in future.     Seasonal Allergies        Social History     Occupational History     Not on file.     Social History Main Topics     Smoking status: Never Smoker     Smokeless tobacco: Never Used     Alcohol use 1.2 oz/week     2 Standard drinks or equivalent per week      Comment: wine 3 x month     Drug use: No     Sexual activity: Yes     Partners: Female       Family History   Problem Relation Age of Onset     Hypertension Mother      CANCER Mother      skin cancer-melanoma     Cardiovascular Father      Eye Disorder Father      glaucoma     DIABETES Brother      DIABETES Sister      DIABETES Sister        REVIEW OF SYSTEMS  10 point review systems performed otherwise negative as noted as per history of present illness.    Physical Exam:  Vitals: Temp 97.2  F (36.2  C)  Ht 1.803 m (5' 11\")  Wt 88 kg (194 lb)  BMI 27.06 kg/m2  BMI= Body mass index is 27.06 kg/(m^2).    Constitutional: healthy, alert and no acute distress   Psychiatric: mentation appears normal and affect normal/bright  NEURO: no focal deficits, CMS intact right upper extremity  RESP: Normal with easy respirations and no use of accessory muscles to breathe, no audible wheezing or retractions  CV: +2 radial pulse and hand is warm  SKIN: No erythema, rashes, excoriation, or breakdown. No evidence of infection.   MUSCULOSKELETAL:    INSPECTION of right shoulder: No gross deformities, erythema, edema, ecchymosis, atrophy or fasciculations.     PALPATION: No " tenderness to palpation of the AC joint, proximal biceps tendon, clavicle, lateral shoulder, posterior shoulder, trapezius area. No increased warmth noted.     ROM: patient is able to forward flex and abduct 2 approximately 180  without any catching ocular pain. Patient can externally rotate 2 approximately 95  and internally rotated to his thoracic spine without any catching ocular pain.     STRENGTH: 5 out of 5 , deltoid as well as internal and external rotation     SPECIAL TEST: Patient has a negative speed test, negative Cook sign, negative cross over test, negative belly press and negative liftoff test, very positive empty can and negative full can test, negative external rotator lag sign,  negative apprehension test.   GAIT: non-antalgic  Lymph: no palpable lymph nodes    Diagnostic Modalities:  today we reviewed the x-rays that were done 3 views of the right shoulder showing us a large calcific deposits posted insertion of the supraspinatus tendon on the AP view. No other fracture dislocation or tumor. No major degenerative joint disease.  Independent visualization of the images was performed.    Impression: 1. Right shoulder calcific tendinitis    Plan:  All of the above pertinent physical exam and imaging modalities findings was reviewed with Kaz.                                          INJECTION PROCEDURE:  The patient was counseled about an  injection, including discussion of risks (including infection), contents of the injection, rationale for performing the injection, and expected benefits of the injection. The skin was prepped with alcohol and betadine and then utilizing sterile technique an injection of the right shoulder subacromial space from the posterolateral approach  was performed. I used phyllis chloride spray prior to doing the injection. The injection consisted 1ml of Kenalog (40mg per 1ml) with 8ml 1% lidocaine plain. The patient tolerated the injection well, and there were no  "complications. The injection site was covered with a Band-Aid. The injection was performed by MEHUL Siddiqui     Patient Instructions:  1. You have a calcium deposit that is quite large in the rotator cuff tendon.    2. You are compinsating very well.  3. The injections of cortisone seem to help absorb the calcium.  We might have to do a few of these.  We decided to to this today.  4. We are not sure why this happens, there is not a specific reason for the calcium settling there.  5. You can followup with Esmer Person MD in 6 weeks, if you are having pain at that time we can do a cortisone injection.  You can always cancel the appointment if you are doing really well and follow-up as needed.   Re-x-ray on return: yes, 3 views of the right shoulder, scapula y/grashy/axillary views.      Scribed by Frankie Gaytan PA-C on 5/8/2017 at 10:21 AM, based on Dr. Esmer Person's statements to me.    This note was dictated with [employer].    DIANE Howell MD      Kaz Moralez is a 51 year old male who is seen in consultation at the request of Dr. You  History of Present illness:  Kaz presents for evaluation of:  1.) Rt shoulder  2.)   Onset:  {sr calendar:984319}    Symptoms brought on by ***.   Location:  rt shoulder.    Character:  {SR Pain Characteristics:066009}.    Progression of symptoms:  {BETTER/WORSE/SAME:546378}.    Previous similar pain: { :722274}.   Pain Level:  {PAIN LEVEL (SCALE OF 10):297015}.   Previous treatments:  {MS RELIEF ITEMS:036340::\"nothing\"}.  Currently on Blood thinners? ***  Diagnosis of Diabetes? ***      Again, thank you for allowing me to participate in the care of your patient.        Sincerely,              Esmer Person MD  "

## 2018-01-12 ENCOUNTER — OFFICE VISIT (OUTPATIENT)
Dept: DERMATOLOGY | Facility: CLINIC | Age: 53
End: 2018-01-12
Payer: COMMERCIAL

## 2018-01-12 VITALS
BODY MASS INDEX: 27.16 KG/M2 | OXYGEN SATURATION: 96 % | DIASTOLIC BLOOD PRESSURE: 69 MMHG | WEIGHT: 194 LBS | HEIGHT: 71 IN | HEART RATE: 84 BPM | SYSTOLIC BLOOD PRESSURE: 129 MMHG

## 2018-01-12 DIAGNOSIS — B07.0 VERRUCA PLANTARIS: ICD-10-CM

## 2018-01-12 DIAGNOSIS — Z85.828 HISTORY OF NONMELANOMA SKIN CANCER: ICD-10-CM

## 2018-01-12 DIAGNOSIS — Z80.8 FAMILY HISTORY OF MELANOMA: Primary | ICD-10-CM

## 2018-01-12 DIAGNOSIS — L57.0 ACTINIC KERATOSIS: ICD-10-CM

## 2018-01-12 PROCEDURE — 99213 OFFICE O/P EST LOW 20 MIN: CPT | Mod: 25 | Performed by: DERMATOLOGY

## 2018-01-12 PROCEDURE — 17110 DESTRUCTION B9 LES UP TO 14: CPT | Performed by: DERMATOLOGY

## 2018-01-12 PROCEDURE — 17003 DESTRUCT PREMALG LES 2-14: CPT | Performed by: DERMATOLOGY

## 2018-01-12 PROCEDURE — 17000 DESTRUCT PREMALG LESION: CPT | Mod: 59 | Performed by: DERMATOLOGY

## 2018-01-12 ASSESSMENT — PAIN SCALES - GENERAL: PAINLEVEL: NO PAIN (0)

## 2018-01-12 NOTE — MR AVS SNAPSHOT
After Visit Summary   1/12/2018    Kaz Moralez    MRN: [id number]           Patient Information     Date Of Birth          1965        Visit Information        Provider Department      1/12/2018 2:30 PM Ashely Yates MD [facility]        Today's Diagnoses     Family history of melanoma    -  1    History of nonmelanoma skin cancer        Verruca plantaris        Actinic keratosis          Care Instructions    CRYOTHERAPY    What is it?    Use of a very cold liquid, such as liquid nitrogen, to freeze and destroy abnormal skin cells that need to be removed    What should I expect?    Tenderness and redness    A small blister that might grow and fill with dark purple blood. There may be crusting.    More than one treatment may be needed if the lesions do not go away.    How do I care for the treated area?    Gently wash the area with your hands when bathing.    Use a thin layer of Vaselin to help with healing. You may use a Band-Aid.     The area should heal within 7-10 days.    Do not use an antibiotic or Neosporin ointment.     You may take acetaminophen (Tylenol) for pain.     Call your Doctor if you have:    Severe pain    Signs of infection (warmth, redness, cloudy yellow drainage, and or a bad smell)    Questions or concerns    Contact infromation:  [facility] [phone]  [facility] [phone]            Follow-ups after your visit        Follow-up notes from your care team     Return in about 3 weeks (around 2/2/2018) for hx of BCC 1 year and 3 weeks for wart.      Your next 10 appointments already scheduled     Feb 02, 2018  2:30 PM CST   Return Visit with Ashely Yates MD   [facility] ([facility])    [address] [address] [place] [address] [phone]              Who to contact     If you have questions or need follow up information about today's clinic  "visit or your schedule please contact [facility] directly at [phone].  Normal or non-critical lab and imaging results will be communicated to you by [employer] Inchart, letter or phone within 4 business days after the clinic has received the results. If you do not hear from us within 7 days, please contact the clinic through [employer] Inchart or phone. If you have a critical or abnormal lab result, we will notify you by phone as soon as possible.  Submit refill requests through [employer] or call your pharmacy and they will forward the refill request to us. Please allow 3 business days for your refill to be completed.          Additional Information About Your Visit        [employer] Incharadflyer Information     [employer] gives you secure access to your electronic health record. If you see a primary care provider, you can also send messages to your care team and make appointments. If you have questions, please call your primary care clinic.  If you do not have a primary care provider, please call [phone] and they will assist you.      [employer] is an electronic gateway that provides easy, online access to your medical records. With [employer], you can request a clinic appointment, read your test results, renew a prescription or communicate with your care team.     To access your existing account, please contact your [facility] or call [phone] for assistance.        Care EveryWhere ID     This is your Care EveryWhere ID. This could be used by other organizations to access your [facility] medical records  [id number]        Your Vitals Were     Pulse Height Pulse Oximetry BMI (Body Mass Index)          84 1.803 m (5' 11\") 96% 27.06 kg/m2         Blood Pressure from Last 3 Encounters:   01/12/18 129/69   05/04/17 124/78   11/09/16 122/84    Weight from Last 3 Encounters:   01/12/18 88 kg (194 lb)   05/08/17 88 kg (194 lb)   05/04/17 88.2 kg (194 lb 8 oz)              We Performed the Following     " DESTRUCT BENIGN LESION, UP TO 14     DESTRUCT PREMALIGNANT LESION, 2-14     DESTRUCT PREMALIGNANT LESION, FIRST        Primary Care Provider Fax #    Physician No Ref-Primary [phone]       No address on file        Equal Access to Services     ELGINCRISSY RADHA : Hadabena melani mtz armaan Sobe, waaxda luqadaha, qaybta kaalmada adelesli, bianca abebe laAdiaaustin whitfield. So [facility] [phone].    ATENCIÓN: Si habla español, tiene a branch disposición servicios gratuitos de asistencia lingüística. Llame al [phone].    We comply with applicable federal civil rights laws and [place] laws. We do not discriminate on the basis of race, color, national origin, age, disability, sex, sexual orientation, or gender identity.            Thank you!     Thank you for choosing [facility]  for your care. Our goal is always to provide you with excellent care. Hearing back from our patients is one way we can continue to improve our services. Please take a few minutes to complete the written survey that you may receive in the mail after your visit with us. Thank you!             Your Updated Medication List - Protect others around you: Learn how to safely use, store and throw away your medicines at www.disposemymeds.org.          This list is accurate as of: 1/12/18  3:09 PM.  Always use your most recent med list.                   Brand Name Dispense Instructions for use Diagnosis    GLUCOSAMINE CHONDROITIN JOINT Tabs      Take by mouth daily        IBUPROFEN PO      Take 400 mg by mouth        tamsulosin 0.4 MG capsule    FLOMAX    90 capsule    Take 1 capsule (0.4 mg) by mouth daily    Abnormality of urination       timolol 0.25 % ophthalmic solution    TIMOPTIC     Place 1 drop into both eyes every morning

## 2018-01-12 NOTE — PROGRESS NOTES
"[facility] Dermatology Note      Dermatology Problem List:  1. Family history of melanoma  2, BCC, right posterior shoulder  -s/p ED&C 9/15/2015  3. Actinic keratosis  -s/p cryotherapy    Encounter Date: Jan 12, 2018    CC:  Chief Complaint   Patient presents with     Consult     1 year skin check, rt temple area is tender when touched burning sensation          History of Present Illness:  Mr. Kaz Moralez is a 52 year old male who presents as a follow-up for history of BCC and family history of melanoma. The patient was last seen 1/5/17 when the patient  Had his wart treated with cryo . The patient has a spot on the forehead that are tender. The wart on his foot is not resolved and he is no longer using OTC treatments as his feet are sweating too much at night. This wart has been there for 3 years. The patient reports no other lesions of concern.      Past Medical History:   Patient Active Problem List   Diagnosis     CARDIOVASCULAR SCREENING; LDL GOAL LESS THAN 160     Glaucoma     Past Medical History:   Diagnosis Date     Unspecified glaucoma(365.9)     Glaucoma     No past surgical history on file.    Social History:  The patient works as an .     Family History:  The patient reports a family history of melanoma and nonmelanoma skin cancer.    Medications:  Current Outpatient Prescriptions   Medication Sig Dispense Refill     tamsulosin (FLOMAX) 0.4 MG capsule Take 1 capsule (0.4 mg) by mouth daily 90 capsule 3     Glucos-Chondroit-Hyaluron-MSM (GLUCOSAMINE CHONDROITIN JOINT) TABS Take by mouth daily       IBUPROFEN PO Take 400 mg by mouth       timolol (TIMOPTIC) 0.25 % ophthalmic solution Place 1 drop into both eyes every morning  0     Allergies   Allergen Reactions     Codeine Nausea and Vomiting     Morphine      Other reaction(s): Vomiting     Penicillins      \"Reaction when I had the mumps.\" Reports paralysis on R-side of body, \"couldn't walk for a week.\" Reports that \"to " "be safe\" doctor advised to avoid in future.     Seasonal Allergies      Review of Systems:  -Skin: As above in HPI. No additional skin concerns.  -Const: The patient is generally feeling well today. The patient lost 10 pounds.     Physical exam:  /69  Pulse 84  Ht 1.803 m (5' 11\")  Wt 88 kg (194 lb)  SpO2 96%  BMI 27.06 kg/m2  GEN: This is a well developed, well-nourished male in no acute distress, in a pleasant mood.    SKIN: Total skin excluding the undergarment areas was performed. The exam included the head/face, neck, both arms, chest, back, abdomen, both legs, digits and/or nails. Declines genital exam.   -There is a well healed surgical scar without erythema, nodularity or telangiectasias on the right posterior shoudler.   -There are erythematous macules with overyling adherent scale on the frontal scalp and right temple.   -There is one  verrucous papule with thrombosed capillaries interrupting dermatoglyphics on the right plantar ball of foot .3mm  -No other lesions of concern on areas examined.     Impression/Plan:  1. Family history of melanoma    ABCD's of melanoma were reviewed with patient and handout provided.     Recommend sunscreens SPF #30 or greater, protective clothing and avoidance of tanning beds.  2. History of nonmelanoma skin cancer, no clincial evidence of recurrence:  Recommend sunscreens SPF #30 or greater, protective clothing and avoidance of tanning beds.  3. Actinic keratosis, right temple and frontal scalp  Cryotherapy procedure note: After verbal consent and discussion of risks and benefits including but no limited to dyspigmentation/scar, blister, and pain, 3 were treated with 1-2mm freeze border for 1 cycle with liquid nitrogen. Post cryotherapy instructions were provided.  4. Verruca plantaris, right plantar ball of foot. History of pain.     Prepared with alcohol and then pared.     Cryotherapy procedure note: After verbal consent and discussion of risks and benefits " including but no limited to dyspigmentation/scar, blister, and pain, 1 was pared with a 15 blade then treated with 1-2mm freeze border for 2 cycles with liquid nitrogen. Post cryotherapy instructions were provided.     Wait 1 week, then may begin OTC treatments. Handout provided to patient.    May return in 1 month for regular treatment      Follow up in 1 year for TBSE, 3 week for verruca vulgaris, earlier for new or changing lesions.     Staff Involved:  Scribe/Staff    Scribe Disclosure:   I, Dana Diallo, am serving as a scribe to document services personally performed by Dr. Ashely Yates, based on data collection and the provider's statements to me.       Provider Disclosure:   The documentation recorded by the scribe accurately reflects the services I personally performed and the decisions made by me.    Ashely Yates MD    Department of Dermatology  [facility]: Phone: [phone], Fax:[phone]  [facility]: Phone: [phone], Fax: [phone]

## 2018-01-12 NOTE — LETTER
1/12/2018         RE: Kaz Moralez  [address] [facility] [address]        Dear Colleague,    Thank you for referring your patient, Kaz Moralez, to the [facility]. Please see a copy of my visit note below.    [facility] Dermatology Note      Dermatology Problem List:  1. Family history of melanoma  2, BCC, right posterior shoulder  -s/p ED&C 9/15/2015  3. Actinic keratosis  -s/p cryotherapy    Encounter Date: Jan 12, 2018    CC:  Chief Complaint   Patient presents with     Consult     1 year skin check, rt temple area is tender when touched burning sensation          History of Present Illness:  Mr. Kaz Moralez is a 52 year old male who presents as a follow-up for history of BCC and family history of melanoma. The patient was last seen 1/5/17 when the patient  Had his wart treated with cryo . The patient has a spot on the forehead that are tender. The wart on his foot is not resolved and he is no longer using OTC treatments as his feet are sweating too much at night. This wart has been there for 3 years. The patient reports no other lesions of concern.      Past Medical History:   Patient Active Problem List   Diagnosis     CARDIOVASCULAR SCREENING; LDL GOAL LESS THAN 160     Glaucoma     Past Medical History:   Diagnosis Date     Unspecified glaucoma(365.9)     Glaucoma     No past surgical history on file.    Social History:  The patient works as an .     Family History:  The patient reports a family history of melanoma and nonmelanoma skin cancer.    Medications:  Current Outpatient Prescriptions   Medication Sig Dispense Refill     tamsulosin (FLOMAX) 0.4 MG capsule Take 1 capsule (0.4 mg) by mouth daily 90 capsule 3     Glucos-Chondroit-Hyaluron-MSM (GLUCOSAMINE CHONDROITIN JOINT) TABS Take by mouth daily       IBUPROFEN PO Take 400 mg by mouth       timolol (TIMOPTIC) 0.25 % ophthalmic solution Place 1 drop into both eyes every morning  0  "    Allergies   Allergen Reactions     Codeine Nausea and Vomiting     Morphine      Other reaction(s): Vomiting     Penicillins      \"Reaction when I had the mumps.\" Reports paralysis on R-side of body, \"couldn't walk for a week.\" Reports that \"to be safe\" doctor advised to avoid in future.     Seasonal Allergies      Review of Systems:  -Skin: As above in HPI. No additional skin concerns.  -Const: The patient is generally feeling well today. The patient lost 10 pounds.     Physical exam:  /69  Pulse 84  Ht 1.803 m (5' 11\")  Wt 88 kg (194 lb)  SpO2 96%  BMI 27.06 kg/m2  GEN: This is a well developed, well-nourished male in no acute distress, in a pleasant mood.    SKIN: Total skin excluding the undergarment areas was performed. The exam included the head/face, neck, both arms, chest, back, abdomen, both legs, digits and/or nails. Declines genital exam.   -There is a well healed surgical scar without erythema, nodularity or telangiectasias on the right posterior shoudler.   -There are erythematous macules with overyling adherent scale on the frontal scalp and right temple.   -There is one  verrucous papule with thrombosed capillaries interrupting dermatoglyphics on the right plantar ball of foot .3mm  -No other lesions of concern on areas examined.     Impression/Plan:  1. Family history of melanoma    ABCD's of melanoma were reviewed with patient and handout provided.     Recommend sunscreens SPF #30 or greater, protective clothing and avoidance of tanning beds.  2. History of nonmelanoma skin cancer, no clincial evidence of recurrence:  Recommend sunscreens SPF #30 or greater, protective clothing and avoidance of tanning beds.  3. Actinic keratosis, right temple and frontal scalp  Cryotherapy procedure note: After verbal consent and discussion of risks and benefits including but no limited to dyspigmentation/scar, blister, and pain, 3 were treated with 1-2mm freeze border for 1 cycle with liquid " nitrogen. Post cryotherapy instructions were provided.  4. Verruca plantaris, right plantar ball of foot. History of pain.     Prepared with alcohol and then pared.     Cryotherapy procedure note: After verbal consent and discussion of risks and benefits including but no limited to dyspigmentation/scar, blister, and pain, 1 was pared with a 15 blade then treated with 1-2mm freeze border for 2 cycles with liquid nitrogen. Post cryotherapy instructions were provided.     Wait 1 week, then may begin OTC treatments. Handout provided to patient.    May return in 1 month for regular treatment      Follow up in 1 year for TBSE, 3 week for verruca vulgaris, earlier for new or changing lesions.     Staff Involved:  Scribe/Staff    Scribe Disclosure:   I, Dana Diallo, am serving as a scribe to document services personally performed by Dr. Ashely Yates, based on data collection and the provider's statements to me.       Provider Disclosure:   The documentation recorded by the scribe accurately reflects the services I personally performed and the decisions made by me.    Ashely Yates MD    Department of Dermatology  [facility]: Phone: [phone], Fax:[phone]  [facility]: Phone: [phone], Fax: [phone]        Again, thank you for allowing me to participate in the care of your patient.        Sincerely,        Ashely Yates MD

## 2018-02-02 ENCOUNTER — OFFICE VISIT (OUTPATIENT)
Dept: DERMATOLOGY | Facility: CLINIC | Age: 53
End: 2018-02-02
Payer: COMMERCIAL

## 2018-02-02 DIAGNOSIS — B07.0 VERRUCA PLANTARIS: Primary | ICD-10-CM

## 2018-02-02 PROCEDURE — 17110 DESTRUCTION B9 LES UP TO 14: CPT | Performed by: DERMATOLOGY

## 2018-02-02 NOTE — MR AVS SNAPSHOT
After Visit Summary   2/2/2018    Kaz Moralez    MRN: [id number]           Patient Information     Date Of Birth          1965        Visit Information        Provider Department      2/2/2018 2:30 PM Ashely Yates MD [facility]        Today's Diagnoses     Verruca plantaris    -  1      Care Instructions    Cryotherapy    What is it?    Use of a very cold liquid, such as liquid nitrogen, to freeze and destroy abnormal skin cells that need to be removed    What should I expect?    Tenderness and redness    A small blister that might grow and fill with dark purple blood. There may be crusting.    More than one treatment may be needed if the lesions do not go away.    How do I care for the treated area?    Gently wash the area with your hands when bathing.    Use a thin layer of Vaseline to help with healing. You may use a Band-Aid.     The area should heal within 7-10 days and may leave behind a pink or lighter color.     Do not use an antibiotic or Neosporin ointment.     You may take acetaminophen (Tylenol) for pain.     Call your Doctor if you have:    Severe pain    Signs of infection (warmth, redness, cloudy yellow drainage, and or a bad smell)    Questions or concerns    Who should I call with questions?       [facility]: [phone]       [facility]: [phone]       For urgent needs outside of business hours call the [facility] at [phone]        and ask for the dermatology resident on call            Follow-ups after your visit        Follow-up notes from your care team     Return in about 3 weeks (around 2/23/2018) for verruca vulgaris.      Your next 10 appointments already scheduled     Jan 11, 2019 10:00 AM CST   Return Visit with Ashely Yates MD   [facility] ([facility])    [address] [address] [place] [id number] [phone]              Who  to contact     If you have questions or need follow up information about today's clinic visit or your schedule please contact [facility] directly at [phone].  Normal or non-critical lab and imaging results will be communicated to you by [employer], letter or phone within 4 business days after the clinic has received the results. If you do not hear from us within 7 days, please contact the clinic through [employer] or phone. If you have a critical or abnormal lab result, we will notify you by phone as soon as possible.  Submit refill requests through [employer] or call your pharmacy and they will forward the refill request to us. Please allow 3 business days for your refill to be completed.          Additional Information About Your Visit        [employer] Information     [employer] gives you secure access to your electronic health record. If you see a primary care provider, you can also send messages to your care team and make appointments. If you have questions, please call your primary care clinic.  If you do not have a primary care provider, please call [phone] and they will assist you.      [employer] is an electronic gateway that provides easy, online access to your medical records. With [employer], you can request a clinic appointment, read your test results, renew a prescription or communicate with your care team.     To access your existing account, please contact your [facility] or call [phone] for assistance.        Care EveryWhere ID     This is your Care EveryWhere ID. This could be used by other organizations to access your [facility] medical records  [id number]         Blood Pressure from Last 3 Encounters:   01/12/18 129/69   05/04/17 124/78   11/09/16 122/84    Weight from Last 3 Encounters:   01/12/18 88 kg (194 lb)   05/08/17 88 kg (194 lb)   05/04/17 88.2 kg (194 lb 8 oz)              We Performed the Following     DESTRUCT BENIGN LESION, UP TO 14         Primary Care Provider Fax #    Physician No Ref-Primary [phone]       No address on file        Equal Access to Services     PHYLICIA GARCIA : Hadii aad ku hadjv Urena, azul sylviedavid, yves hale, bianca dickin hayaan andrewkanchan abebe ladarylloretta whitfield. So [facility] [phone].    ATENCIÓN: Si habla español, tiene a branch disposición servicios gratuitos de asistencia lingüística. Llame al [phone].    We comply with applicable federal civil rights laws and [place] laws. We do not discriminate on the basis of race, color, national origin, age, disability, sex, sexual orientation, or gender identity.            Thank you!     Thank you for choosing [facility]  for your care. Our goal is always to provide you with excellent care. Hearing back from our patients is one way we can continue to improve our services. Please take a few minutes to complete the written survey that you may receive in the mail after your visit with us. Thank you!             Your Updated Medication List - Protect others around you: Learn how to safely use, store and throw away your medicines at www.disposemymeds.org.          This list is accurate as of 2/2/18  2:47 PM.  Always use your most recent med list.                   Brand Name Dispense Instructions for use Diagnosis    GLUCOSAMINE CHONDROITIN JOINT Tabs      Take by mouth daily        IBUPROFEN PO      Take 400 mg by mouth        tamsulosin 0.4 MG capsule    FLOMAX    90 capsule    Take 1 capsule (0.4 mg) by mouth daily    Abnormality of urination       timolol 0.25 % ophthalmic solution    TIMOPTIC     Place 1 drop into both eyes every morning

## 2018-02-02 NOTE — PATIENT INSTRUCTIONS
Cryotherapy    What is it?    Use of a very cold liquid, such as liquid nitrogen, to freeze and destroy abnormal skin cells that need to be removed    What should I expect?    Tenderness and redness    A small blister that might grow and fill with dark purple blood. There may be crusting.    More than one treatment may be needed if the lesions do not go away.    How do I care for the treated area?    Gently wash the area with your hands when bathing.    Use a thin layer of Vaseline to help with healing. You may use a Band-Aid.     The area should heal within 7-10 days and may leave behind a pink or lighter color.     Do not use an antibiotic or Neosporin ointment.     You may take acetaminophen (Tylenol) for pain.     Call your Doctor if you have:    Severe pain    Signs of infection (warmth, redness, cloudy yellow drainage, and or a bad smell)    Questions or concerns    Who should I call with questions?       [facility]: [phone]       [facility]: [phone]       For urgent needs outside of business hours call the [facility] at [phone]        and ask for the dermatology resident on call

## 2018-02-02 NOTE — LETTER
2/2/2018         RE: Kaz Moralez  [address] [facility] [address]        Dear Colleague,    Thank you for referring your patient, Kaz Moralez, to the [facility]. Please see a copy of my visit note below.    [facility] Dermatology Note      Dermatology Problem List:  1. Family history of melanoma  2, BCC, right posterior shoulder  -s/p ED&C 9/15/2015  3. Actinic keratosis  -s/p cryotherapy    Encounter Date: Feb 2, 2018    CC:  Chief Complaint   Patient presents with     RECHECK     wart - pared at home - small black spot still seen         History of Present Illness:  Mr. Kaz Moralez is a 52 year old male who presents as a follow-up for wart treatment. The patient was last seen 1/12/18 when the patient had warts treated. The patient now reports that a small black dot is still seen, but it is improved. No longer feels painful and like a rock. The patient reports that he pared it before coming to the clinic. The patient also reports that he was limping for a week at the last visit.     Past Medical History:   Patient Active Problem List   Diagnosis     CARDIOVASCULAR SCREENING; LDL GOAL LESS THAN 160     Glaucoma     Past Medical History:   Diagnosis Date     Unspecified glaucoma(365.9)     Glaucoma     No past surgical history on file.    Social History:  The patient works.    Family History:  There is no family history of melanoma.    Medications:  Current Outpatient Prescriptions   Medication Sig Dispense Refill     tamsulosin (FLOMAX) 0.4 MG capsule Take 1 capsule (0.4 mg) by mouth daily 90 capsule 3     Glucos-Chondroit-Hyaluron-MSM (GLUCOSAMINE CHONDROITIN JOINT) TABS Take by mouth daily       IBUPROFEN PO Take 400 mg by mouth       timolol (TIMOPTIC) 0.25 % ophthalmic solution Place 1 drop into both eyes every morning  0       Allergies   Allergen Reactions     Codeine Nausea and Vomiting     Morphine      Other reaction(s): Vomiting      "Penicillins      \"Reaction when I had the mumps.\" Reports paralysis on R-side of body, \"couldn't walk for a week.\" Reports that \"to be safe\" doctor advised to avoid in future.     Seasonal Allergies        Review of Systems:  -Constitutional: The patient is feeling generally well.   -Skin: As above in HPI. No additional skin concerns.    Physical exam:  Vitals: There were no vitals taken for this visit.  GEN: This is a well developed, well-nourished male in no acute distress, in a pleasant mood.    SKIN: Focused examination of the foot was performed.  -There is one  verrucous papule with thrombosed capillaries interrupting dermatoglyphics on the right plantar ball of foot.thinner since last visit.   -No other lesions of concern on areas examined.       Impression/Plan:  1. Family history of melanoma and History of nonmelanoma skin cancer    Next skin exam due Jan 2019    2.  Verruca vulagris     Cryotherapy procedure note: After verbal consent and discussion of risks and benefits including but no limited to dyspigmentation/scar, blister, and pain, 1 was(were) treated with 1-2mm freeze border for 2 cycles with liquid nitrogen. Post cryotherapy instructions were provided.        Follow-up in 3 weks      Staff Involved:  Scribe/Staff      Scribe Disclosure:   I, Dana Diallo, am serving as a scribe to document services personally performed by Dr. Ashely Yates, based on data collection and the provider's statements to me.       Provider Disclosure:   The documentation recorded by the scribe accurately reflects the services I personally performed and the decisions made by me.    Ashely Yates MD    Department of Dermatology  [facility]: Phone: [phone], Fax:[phone]  [facility]: Phone: [phone], Fax: [phone]        Again, thank you for allowing me to participate in the care of " your patient.        Sincerely,        Ashely Yates MD

## 2018-02-02 NOTE — PROGRESS NOTES
"[facility] Dermatology Note      Dermatology Problem List:  1. Family history of melanoma  2, BCC, right posterior shoulder  -s/p ED&C 9/15/2015  3. Actinic keratosis  -s/p cryotherapy    Encounter Date: Feb 2, 2018    CC:  Chief Complaint   Patient presents with     RECHECK     wart - pared at home - small black spot still seen         History of Present Illness:  Mr. Kaz Moralez is a 52 year old male who presents as a follow-up for wart treatment. The patient was last seen 1/12/18 when the patient had warts treated. The patient now reports that a small black dot is still seen, but it is improved. No longer feels painful and like a rock. The patient reports that he pared it before coming to the clinic. The patient also reports that he was limping for a week at the last visit.     Past Medical History:   Patient Active Problem List   Diagnosis     CARDIOVASCULAR SCREENING; LDL GOAL LESS THAN 160     Glaucoma     Past Medical History:   Diagnosis Date     Unspecified glaucoma(365.9)     Glaucoma     No past surgical history on file.    Social History:  The patient works.    Family History:  There is no family history of melanoma.    Medications:  Current Outpatient Prescriptions   Medication Sig Dispense Refill     tamsulosin (FLOMAX) 0.4 MG capsule Take 1 capsule (0.4 mg) by mouth daily 90 capsule 3     Glucos-Chondroit-Hyaluron-MSM (GLUCOSAMINE CHONDROITIN JOINT) TABS Take by mouth daily       IBUPROFEN PO Take 400 mg by mouth       timolol (TIMOPTIC) 0.25 % ophthalmic solution Place 1 drop into both eyes every morning  0       Allergies   Allergen Reactions     Codeine Nausea and Vomiting     Morphine      Other reaction(s): Vomiting     Penicillins      \"Reaction when I had the mumps.\" Reports paralysis on R-side of body, \"couldn't walk for a week.\" Reports that \"to be safe\" doctor advised to avoid in future.     Seasonal Allergies        Review of Systems:  -Constitutional: The patient is " feeling generally well.   -Skin: As above in HPI. No additional skin concerns.    Physical exam:  Vitals: There were no vitals taken for this visit.  GEN: This is a well developed, well-nourished male in no acute distress, in a pleasant mood.    SKIN: Focused examination of the foot was performed.  -There is one  verrucous papule with thrombosed capillaries interrupting dermatoglyphics on the right plantar ball of foot.thinner since last visit.   -No other lesions of concern on areas examined.       Impression/Plan:  1. Family history of melanoma and History of nonmelanoma skin cancer    Next skin exam due Jan 2019    2.  Verruca vulagris     Cryotherapy procedure note: After verbal consent and discussion of risks and benefits including but no limited to dyspigmentation/scar, blister, and pain, 1 was(were) treated with 1-2mm freeze border for 2 cycles with liquid nitrogen. Post cryotherapy instructions were provided.        Follow-up in 3 [facility]      Staff Involved:  Scribe/Staff      Scribe Disclosure:   I, Dana Diallo, am serving as a scribe to document services personally performed by Dr. Ashely Yates, based on data collection and the provider's statements to me.       Provider Disclosure:   The documentation recorded by the scribe accurately reflects the services I personally performed and the decisions made by me.    Ashely Yates MD    Department of Dermatology  [facility]: Phone: [phone], Fax:[phone]  [facility]: Phone: [phone], Fax: [phone]

## 2018-02-02 NOTE — NURSING NOTE
Dermatology Rooming Note    Kaz Moralez's goals for this visit include:   Chief Complaint   Patient presents with     RECHECK     wart - pared at home - small black spot still seen       Is a scribe okay for this visit: YES    Are records needed for this visit(If yes, obtain release of information): NO     Vitals: There were no vitals taken for this visit.    Referring Provider:  No referring provider defined for this encounter.    Ailyn Leonard, CMA

## 2018-02-08 ENCOUNTER — MYC MEDICAL ADVICE (OUTPATIENT)
Dept: FAMILY MEDICINE | Facility: CLINIC | Age: 53
End: 2018-02-08

## 2018-02-08 NOTE — TELEPHONE ENCOUNTER
": 1965  PHONE #'s: [phone] (home) none (work)    PRESENTING PROBLEM:  Patient is wondering if he has a prostatitis going on since falling on his buttock hard , 2/3/18?   NURSING ASSESSMENT  Description:  I was snow blowing my driveway and I slipped and landed HARD on my butt. I felt like I min my back , but have been taking it easy, using ice packs, Alternating Motrin with Tylenol. Things have been manageable. Now the pain seems to have settled into my lower back, bilaterally and feel pressure in the pelvic area.  I have had prostate issues for the past 30 years, this feels like might has an enlarged prostate. I haven't seen a Urologist in years. Dr. You usually just puts me on Cipro for 10 days and that takes care of it. I know something is up because when I have a BM, I pass large amount of semen thru my penis.   Onset/duration:  Past couple of days  Precip. factors:   Etiology unknown  Assoc. Sx:  NO fever, \" My back feels warm to the touch.\"   Improves/worsens Sx:  Back improved, but now the lower back is more bothersome  Pain scale (1-10)   4/10  Sx specific meds:  FLOMAX  Last exam/Tx:  Has NOT been seen for this.     RECOMMENDED DISPOSITION:  See in 24 hours - Scheduled to see Dr. Luis Felipe Parada tomorrow at 1:45 at [facility].  Will comply with recommendation: YES  If further questions/concerns or if Sx do not improve, worsen or new Sx develop, call your PCP or [facility] as soon as possible.    NOTES:  Disposition was determined by the first positive assessment question, therefore all previous assessment questions were negative.  Informed to check provider manual or call insurance company to assure coverage.    Guideline used: Back pain  ( and sought MD advise as to appt or ER with his SX?  Said appt was OK ER if fever, but NO fever)  Telephone Triage Protocols for Nurses, Fifth Edition, Buffy Julien RN  2018    "

## 2018-02-09 ENCOUNTER — OFFICE VISIT (OUTPATIENT)
Dept: FAMILY MEDICINE | Facility: CLINIC | Age: 53
End: 2018-02-09
Payer: COMMERCIAL

## 2018-02-09 VITALS
WEIGHT: 203 LBS | TEMPERATURE: 98.5 F | RESPIRATION RATE: 20 BRPM | SYSTOLIC BLOOD PRESSURE: 128 MMHG | OXYGEN SATURATION: 100 % | HEIGHT: 71 IN | DIASTOLIC BLOOD PRESSURE: 74 MMHG | BODY MASS INDEX: 28.42 KG/M2 | HEART RATE: 84 BPM

## 2018-02-09 DIAGNOSIS — N41.0 ACUTE BACTERIAL PROSTATITIS: Primary | ICD-10-CM

## 2018-02-09 LAB
ALBUMIN UR-MCNC: NEGATIVE MG/DL
APPEARANCE UR: CLEAR
BILIRUB UR QL STRIP: NEGATIVE
COLOR UR AUTO: ABNORMAL
GLUCOSE UR STRIP-MCNC: NEGATIVE MG/DL
HGB UR QL STRIP: NEGATIVE
KETONES UR STRIP-MCNC: NEGATIVE MG/DL
LEUKOCYTE ESTERASE UR QL STRIP: NEGATIVE
MUCOUS THREADS #/AREA URNS LPF: PRESENT /LPF
NITRATE UR QL: NEGATIVE
PH UR STRIP: 7 PH (ref 5–7)
RBC #/AREA URNS AUTO: 0 /HPF (ref 0–2)
SOURCE: ABNORMAL
SP GR UR STRIP: 1.01 (ref 1–1.03)
UROBILINOGEN UR STRIP-MCNC: 0 MG/DL (ref 0–2)
WBC #/AREA URNS AUTO: 0 /HPF (ref 0–2)

## 2018-02-09 PROCEDURE — 99213 OFFICE O/P EST LOW 20 MIN: CPT | Performed by: FAMILY MEDICINE

## 2018-02-09 PROCEDURE — 81001 URINALYSIS AUTO W/SCOPE: CPT | Performed by: FAMILY MEDICINE

## 2018-02-09 PROCEDURE — 87086 URINE CULTURE/COLONY COUNT: CPT | Performed by: FAMILY MEDICINE

## 2018-02-09 RX ORDER — CIPROFLOXACIN 500 MG/1
500 TABLET, FILM COATED ORAL 2 TIMES DAILY
Qty: 84 TABLET | Refills: 0 | Status: SHIPPED | OUTPATIENT
Start: 2018-02-09 | End: 2018-03-23

## 2018-02-09 ASSESSMENT — PAIN SCALES - GENERAL: PAINLEVEL: MODERATE PAIN (5)

## 2018-02-09 NOTE — MR AVS SNAPSHOT
After Visit Summary   2/9/2018    Kaz Moralez    MRN: [id number]           Patient Information     Date Of Birth          1965        Visit Information        Provider Department      2/9/2018 1:45 PM Luis Felipe Parada MD [facility]        Today's Diagnoses     Acute bacterial prostatitis    -  1       Follow-ups after your visit        Your next 10 appointments already scheduled     Mar 02, 2018  3:15 PM CST   Return Visit with Ashely Yates MD   [facility] ([facility])    [address] [place] [id number] [phone]            Jan 11, 2019 10:00 AM CST   Return Visit with Ashely Yates MD   [facility])    [id number] [place] [address] [phone]              Who to contact     If you have questions or need follow up information about today's clinic visit or your schedule please contact [facility] directly at [phone].  Normal or non-critical lab and imaging results will be communicated to you by MyChart, letter or phone within 4 business days after the clinic has received the results. If you do not hear from us within 7 days, please contact the clinic through MyChart or phone. If you have a critical or abnormal lab result, we will notify you by phone as soon as possible.  Submit refill requests through [employer] or call your pharmacy and they will forward the refill request to us. Please allow 3 business days for your refill to be completed.          Additional Information About Your Visit        MyChart Information     [employer] gives you secure access to your electronic health record. If you see a primary care provider, you can also send messages to your care team and make appointments. If you have questions, please call your primary care clinic.  If you do not have a primary care provider, please call [phone] and they will assist  "you.        Care EveryWhere ID     This is your Care EveryWhere ID. This could be used by other organizations to access your [place] medical records  [id number]        Your Vitals Were     Pulse Temperature Respirations Height Pulse Oximetry BMI (Body Mass Index)    84 98.5  F (36.9  C) (Temporal) 20 5' 11\" (1.803 m) 100% 28.31 kg/m2       Blood Pressure from Last 3 Encounters:   02/09/18 128/74   01/12/18 129/69   05/04/17 124/78    Weight from Last 3 Encounters:   02/09/18 203 lb (92.1 kg)   01/12/18 194 lb (88 kg)   05/08/17 194 lb (88 kg)              We Performed the Following     UA with Microscopic ([place])     Urine Culture Aerobic Bacterial          Today's Medication Changes          These changes are accurate as of 2/9/18  4:06 PM.  If you have any questions, ask your nurse or doctor.               Start taking these medicines.        Dose/Directions    ciprofloxacin 500 MG tablet   Commonly known as:  CIPRO   Used for:  Acute bacterial prostatitis   Started by:  Luis Felipe Parada MD        Dose:  500 mg   Take 1 tablet (500 mg) by mouth 2 times daily   Quantity:  84 tablet   Refills:  0            Where to get your medicines      These medications were sent to [facility] [address] [place] [address]     Phone:  [phone]     ciprofloxacin 500 MG tablet                Primary Care Provider Office Phone # Fax #    Kadeem You [phone]       [address] [place] [address]        Equal Access to Services     [facility] campusCHRISTEL AH: Hadii aad ku hadasho Sobe, waaxda luqadaha, qaybta kaalmada bianca hale. So [facility] [phone].    ATENCIÓN: Si habla español, tiene a branch disposición servicios gratuitos de asistencia lingüística. Llame al [phone].    We comply with applicable federal civil rights laws and [place] laws. We do not discriminate on " the basis of race, color, national origin, age, disability, sex, sexual orientation, or gender identity.            Thank you!     Thank you for choosing [facility]  for your care. Our goal is always to provide you with excellent care. Hearing back from our patients is one way we can continue to improve our services. Please take a few minutes to complete the written survey that you may receive in the mail after your visit with us. Thank you!             Your Updated Medication List - Protect others around you: Learn how to safely use, store and throw away your medicines at www.disposemymeds.org.          This list is accurate as of 2/9/18  4:06 PM.  Always use your most recent med list.                   Brand Name Dispense Instructions for use Diagnosis    ciprofloxacin 500 MG tablet    CIPRO    84 tablet    Take 1 tablet (500 mg) by mouth 2 times daily    Acute bacterial prostatitis       GLUCOSAMINE CHONDROITIN JOINT Tabs      Take by mouth daily        IBUPROFEN PO      Take 400 mg by mouth        tamsulosin 0.4 MG capsule    FLOMAX    90 capsule    Take 1 capsule (0.4 mg) by mouth daily    Abnormality of urination       timolol 0.25 % ophthalmic solution    TIMOPTIC     Place 1 drop into both eyes every morning

## 2018-02-09 NOTE — NURSING NOTE
"No chief complaint on file.      Initial There were no vitals taken for this visit. Estimated body mass index is 27.06 kg/(m^2) as calculated from the following:    Height as of 1/12/18: 5' 11\" (1.803 m).    Weight as of 1/12/18: 194 lb (88 kg).  Medication Reconciliation: complete    "

## 2018-02-09 NOTE — PROGRESS NOTES
"  SUBJECTIVE:   Kaz Moralez is a 52 year old male who presents to clinic today for the following health issues:      Home snow blowing on 2-3-18 and fell backwards onto buttocks hard. He states his low back is very sore. When he bears down to have a BM he has semen coming out of his penis. Wondering about a prostate infection.  Pt is having a hard time walking and sitting in chair.         Problem list and histories reviewed & adjusted, as indicated.  Additional history: as documented        Reviewed and updated as needed this visit by clinical staff  Tobacco  Allergies  Meds  Problems       Reviewed and updated as needed this visit by Provider  Allergies  Meds  Problems         For about 4-5 days, patient has noted that he has whitish discharge coming from his penis when he has bowel movements.  He has no pain with bowel movements.  No blood in the stool, no hematuria.  He has not had intercourse for the symptoms started.  No fevers, chills, nausea or vomiting.  No dysuria.  Patient has a history of prostatitis multiple times.  Usually takes Cipro and then improves.      ROS:  10 point ROS of systems including Constitutional, Eyes, HENT, Respiratory, Cardiovascular, Gastroenterology, Genitourinary, Integumentary, Muscularskeletal, Psychiatric were all negative except for pertinent positives noted in my HPI.     OBJECTIVE:   /74  Pulse 84  Temp 98.5  F (36.9  C) (Temporal)  Resp 20  Ht 5' 11\" (1.803 m)  Wt 203 lb (92.1 kg)  SpO2 100%  BMI 28.31 kg/m2  Body mass index is 28.31 kg/(m^2).  Physical Exam   Constitutional: He appears well-developed and well-nourished.   Cardiovascular: Normal rate, regular rhythm, S1 normal, S2 normal and normal heart sounds.    No murmur heard.  Pulmonary/Chest: Effort normal and breath sounds normal. No respiratory distress. He has no wheezes. He has no rhonchi. He has no rales.   Genitourinary: Rectum normal and penis normal. Prostate is enlarged. Prostate is " not tender. Circumcised.   Genitourinary Comments: Palpation the prostate showed enlarged, boggy, nontender prostate.  Patient reported discharge from the penis well prostate exam occurred.   Neurological: He is alert.     Results for orders placed or performed in visit on 02/09/18   UA with Microscopic ([facility])   Result Value Ref Range    Color Urine Blue     Appearance Urine Clear     Glucose Urine Negative NEG^Negative mg/dL    Bilirubin Urine Negative NEG^Negative    Ketones Urine Negative NEG^Negative mg/dL    Specific Gravity Urine 1.006 1.003 - 1.035    Blood Urine Negative NEG^Negative    pH Urine 7.0 5.0 - 7.0 pH    Protein Albumin Urine Negative NEG^Negative mg/dL    Urobilinogen mg/dL 0.0 0.0 - 2.0 mg/dL    Nitrite Urine Negative NEG^Negative    Leukocyte Esterase Urine Negative NEG^Negative    Source Unspecified Urine     WBC Urine 0 0 - 2 /HPF    RBC Urine 0 0 - 2 /HPF    Mucous Urine Present (A) NEG^Negative /LPF      ASSESSMENT/PLAN:       ICD-10-CM    1. Acute bacterial prostatitis N41.0 Urine Culture Aerobic Bacterial     ciprofloxacin (CIPRO) 500 MG tablet     UA with Microscopic ([facility])     CANCELED: UA with Microscopic reflex to Culture     PLAN:  1.  We will treat prostatitis with Cipro 500 mg twice daily.  Urinalysis and culture obtained today to help confirm diagnosis or assist in alternative diagnosis if patient's symptoms are not improved with today's antibiotic.    Follow up with Provider -only if symptoms fail to improve in the next 3-5 days, sooner if symptoms worsen.    Luis Felipe Parada MD   [facility]

## 2018-02-10 LAB
BACTERIA SPEC CULT: NO GROWTH
Lab: NORMAL
SPECIMEN SOURCE: NORMAL

## 2018-02-12 NOTE — PROGRESS NOTES
Kaz,  Your results show no actual bacteria in your urine.  However, with prostatitis this sometimes is the case.  If your symptoms are improving with the antibiotic we prescribed last week, I would continue it as we discussed.  Please let me know if you have any questions.    Sincerely,  Dr. Parada

## 2018-07-09 ENCOUNTER — MYC REFILL (OUTPATIENT)
Dept: FAMILY MEDICINE | Facility: CLINIC | Age: 53
End: 2018-07-09

## 2018-07-09 ENCOUNTER — MYC MEDICAL ADVICE (OUTPATIENT)
Dept: FAMILY MEDICINE | Facility: CLINIC | Age: 53
End: 2018-07-09

## 2018-07-09 DIAGNOSIS — R39.198 ABNORMALITY OF URINATION: ICD-10-CM

## 2018-07-09 RX ORDER — TAMSULOSIN HYDROCHLORIDE 0.4 MG/1
0.4 CAPSULE ORAL DAILY
Qty: 90 CAPSULE | Refills: 3 | Status: CANCELLED | OUTPATIENT
Start: 2018-07-09

## 2018-07-09 NOTE — LETTER
[address] [address]  Phone: [phone]  Fax: [phone]        July 13, 2018      Kaz Moralez                                                                                                                                [address] [facility] [address]            Dear Mr. Moralez,    We are concerned about your health care.  We recently provided you with a medication refill.  Many medications require routine follow-up with your Doctor.      At this time we ask that: You schedule a routine office visit with your physician to follow your medical care. You have not been seen in over a year and are due for your yearly physical.       Your prescription: Has been refilled for 1 month so you may have time for the above noted follow-up.      Thank you,      Dr. You Care Team

## 2018-07-09 NOTE — TELEPHONE ENCOUNTER
[facility]  Original authorizing provider: Kadeem You MD, MD Kaz Moralez would like a refill of the following medications:  tamsulosin (FLOMAX) 0.4 MG capsule [Kadeem You MD, MD]    Preferred pharmacy: [facility] [address] [place] [address]    Comment:

## 2018-07-09 NOTE — TELEPHONE ENCOUNTER
[facility]  Original authorizing provider: Kadeem You MD, MD Kaz Moralez would like a refill of the following medications:  tamsulosin (FLOMAX) 0.4 MG capsule [Kadeem You MD, MD]    Preferred pharmacy: [facility] [address] [place] [address]    Comment:

## 2018-07-09 NOTE — TELEPHONE ENCOUNTER
"Requested Prescriptions   Pending Prescriptions Disp Refills     tamsulosin (FLOMAX) 0.4 MG capsule 90 capsule 3    Last Written Prescription Date:  04/13/2017  Last Fill Quantity: 90,  # refills: 3   Last office visit: 2/9/2018 with prescribing provider:  02/09/2018   Future Office Visit:     Sig: Take 1 capsule (0.4 mg) by mouth daily    Alpha Blockers Passed    7/9/2018 12:40 PM       Passed - Blood pressure under 140/90 in past 12 months    BP Readings from Last 3 Encounters:   02/09/18 128/74   01/12/18 129/69   05/04/17 124/78                Passed - Recent (12 mo) or future (30 days) visit within the authorizing provider's specialty    Patient had office visit in the last 12 months or has a visit in the next 30 days with authorizing provider or within the authorizing provider's specialty.  See \"Patient Info\" tab in inbasket, or \"Choose Columns\" in Meds & Orders section of the refill encounter.           Passed - Patient does not have Tadalafil, Vardenafil, or Sildenafil on their medication list       Passed - Patient is 18 years of age or older          "

## 2018-07-11 RX ORDER — TAMSULOSIN HYDROCHLORIDE 0.4 MG/1
0.4 CAPSULE ORAL DAILY
Qty: 90 CAPSULE | Refills: 0 | Status: SHIPPED | OUTPATIENT
Start: 2018-07-11 | End: 2020-07-10

## 2018-07-11 NOTE — TELEPHONE ENCOUNTER
Tried to call pt. Phone rings once, then dead air. Will try again at a later time.   Thank you,  Esmer Saleh- Pt Rep.

## 2018-07-11 NOTE — TELEPHONE ENCOUNTER
Medication is being filled for 1 time refill only due to:  Patient needs to be seen because it has been more than one year since last visit.     Patient is due for yearly physical.     Kim Boykin RN

## 2018-07-13 NOTE — TELEPHONE ENCOUNTER
3rd attempt to reach pt. Phone rings once and then hangs up. Routing back to team to send letter.  Thank you,  Esmer Saleh- Pt Rep.

## 2018-09-19 ENCOUNTER — OFFICE VISIT (OUTPATIENT)
Dept: FAMILY MEDICINE | Facility: CLINIC | Age: 53
End: 2018-09-19
Payer: COMMERCIAL

## 2018-09-19 VITALS
RESPIRATION RATE: 14 BRPM | SYSTOLIC BLOOD PRESSURE: 124 MMHG | TEMPERATURE: 96.4 F | DIASTOLIC BLOOD PRESSURE: 78 MMHG | OXYGEN SATURATION: 97 % | WEIGHT: 189.13 LBS | HEIGHT: 71 IN | BODY MASS INDEX: 26.48 KG/M2 | HEART RATE: 97 BPM

## 2018-09-19 DIAGNOSIS — Z13.6 CARDIOVASCULAR SCREENING; LDL GOAL LESS THAN 160: ICD-10-CM

## 2018-09-19 DIAGNOSIS — Z11.4 SCREENING FOR HIV (HUMAN IMMUNODEFICIENCY VIRUS): ICD-10-CM

## 2018-09-19 DIAGNOSIS — Z00.00 ROUTINE GENERAL MEDICAL EXAMINATION AT A HEALTH CARE FACILITY: Primary | ICD-10-CM

## 2018-09-19 DIAGNOSIS — Z23 NEED FOR PROPHYLACTIC VACCINATION AND INOCULATION AGAINST INFLUENZA: ICD-10-CM

## 2018-09-19 DIAGNOSIS — N41.1 CHRONIC PROSTATITIS: ICD-10-CM

## 2018-09-19 LAB
CHOLEST SERPL-MCNC: 189 MG/DL
ERYTHROCYTE [DISTWIDTH] IN BLOOD BY AUTOMATED COUNT: 12.3 % (ref 10–15)
GLUCOSE SERPL-MCNC: 108 MG/DL (ref 70–99)
HCT VFR BLD AUTO: 48.2 % (ref 40–53)
HDLC SERPL-MCNC: 39 MG/DL
HGB BLD-MCNC: 16.3 G/DL (ref 13.3–17.7)
HIV 1+2 AB+HIV1 P24 AG SERPL QL IA: NONREACTIVE
LDLC SERPL CALC-MCNC: 119 MG/DL
MCH RBC QN AUTO: 32.7 PG (ref 26.5–33)
MCHC RBC AUTO-ENTMCNC: 33.8 G/DL (ref 31.5–36.5)
MCV RBC AUTO: 97 FL (ref 78–100)
NONHDLC SERPL-MCNC: 150 MG/DL
PLATELET # BLD AUTO: 175 10E9/L (ref 150–450)
PSA SERPL-ACNC: 3.04 UG/L (ref 0–4)
RBC # BLD AUTO: 4.99 10E12/L (ref 4.4–5.9)
TRIGL SERPL-MCNC: 155 MG/DL
WBC # BLD AUTO: 5.4 10E9/L (ref 4–11)

## 2018-09-19 PROCEDURE — 87389 HIV-1 AG W/HIV-1&-2 AB AG IA: CPT | Performed by: FAMILY MEDICINE

## 2018-09-19 PROCEDURE — 80061 LIPID PANEL: CPT | Performed by: FAMILY MEDICINE

## 2018-09-19 PROCEDURE — G0103 PSA SCREENING: HCPCS | Performed by: FAMILY MEDICINE

## 2018-09-19 PROCEDURE — 99396 PREV VISIT EST AGE 40-64: CPT | Performed by: FAMILY MEDICINE

## 2018-09-19 PROCEDURE — 82947 ASSAY GLUCOSE BLOOD QUANT: CPT | Performed by: FAMILY MEDICINE

## 2018-09-19 PROCEDURE — 36415 COLL VENOUS BLD VENIPUNCTURE: CPT | Performed by: FAMILY MEDICINE

## 2018-09-19 PROCEDURE — 85027 COMPLETE CBC AUTOMATED: CPT | Performed by: FAMILY MEDICINE

## 2018-09-19 RX ORDER — LEVOFLOXACIN 500 MG/1
500 TABLET, FILM COATED ORAL DAILY
Qty: 10 TABLET | Refills: 0 | Status: SHIPPED | OUTPATIENT
Start: 2018-09-19 | End: 2020-07-10

## 2018-09-19 ASSESSMENT — PAIN SCALES - GENERAL: PAINLEVEL: NO PAIN (0)

## 2018-09-19 NOTE — PROGRESS NOTES
SUBJECTIVE:   CC: Kaz Moralez is an 53 year old male who presents for preventative health visit.     Physical   Annual:     Getting at least 3 servings of Calcium per day:  NO    Bi-annual eye exam:  Yes    Dental care twice a year:  Yes    Sleep apnea or symptoms of sleep apnea:  Daytime drowsiness    Diet:  Carbohydrate counting    Frequency of exercise:  6-7 days/week    Duration of exercise:  45-60 minutes    Taking medications regularly:  Yes    Medication side effects:  Other    Additional concerns today:  YES        He would like to talk to you about a fall that he had last winter. He is having back pain from that.     Today's PHQ-2 Score:   PHQ-2 ( 1999 [employer]) 9/19/2018   Q1: Little interest or pleasure in doing things 1   Q2: Feeling down, depressed or hopeless 1   PHQ-2 Score 2   Q1: Little interest or pleasure in doing things Several days   Q2: Feeling down, depressed or hopeless Several days   PHQ-2 Score 2       Abuse: Current or Past(Physical, Sexual or Emotional)- No  Do you feel safe in your environment - Yes    Social History   Substance Use Topics     Smoking status: Never Smoker     Smokeless tobacco: Never Used     Alcohol use Yes      Comment: wine 3 x month     Alcohol Use 9/19/2018   If you drink alcohol do you typically have greater than 3 drinks per day OR greater than 7 drinks per week? No       Last PSA:   PSA   Date Value Ref Range Status   11/09/2016 2.07 0 - 4 ug/L Final     Comment:     Assay Method:  Chemiluminescence using Siemens Vista analyzer       Reviewed orders with patient. Reviewed health maintenance and updated orders accordingly - Yes  BP Readings from Last 3 Encounters:   09/19/18 124/78   02/09/18 128/74   01/12/18 129/69    Wt Readings from Last 3 Encounters:   09/19/18 189 lb 2 oz (85.8 kg)   02/09/18 203 lb (92.1 kg)   01/12/18 194 lb (88 kg)                  Patient Active Problem List   Diagnosis     CARDIOVASCULAR SCREENING; LDL GOAL LESS THAN 160     Glaucoma      No past surgical history on file.    Social History   Substance Use Topics     Smoking status: Never Smoker     Smokeless tobacco: Never Used     Alcohol use Yes      Comment: wine 3 x month     Family History   Problem Relation Age of Onset     Hypertension Mother      Cancer Mother      skin cancer-melanoma     Cardiovascular Father      Eye Disorder Father      glaucoma     Diabetes Brother      Diabetes Sister      Diabetes Sister          Current Outpatient Prescriptions   Medication Sig Dispense Refill     Glucos-Chondroit-Hyaluron-MSM (GLUCOSAMINE CHONDROITIN JOINT) TABS Take by mouth daily       tamsulosin (FLOMAX) 0.4 MG capsule Take 1 capsule (0.4 mg) by mouth daily 90 capsule 0     timolol (TIMOPTIC) 0.25 % ophthalmic solution Place 1 drop into both eyes every morning  0       Reviewed and updated as needed this visit by clinical staff  Tobacco  Allergies  Meds  Fam Hx  Soc Hx        Reviewed and updated as needed this visit by Provider            Review of Systems  CONSTITUTIONAL: NEGATIVE for fever, chills, change in weight  INTEGUMENTARY/SKIN: NEGATIVE for worrisome rashes, moles or lesions  EYES: NEGATIVE for vision changes or irritation  ENT: NEGATIVE for ear, mouth and throat problems  RESP: NEGATIVE for significant cough or SOB  CV: NEGATIVE for chest pain, palpitations or peripheral edema  GI: NEGATIVE for nausea, abdominal pain, heartburn, or change in bowel habits   male: Patient states he still has issues with chronic prostatitis sometimes he will get semen discharge when he defecates.  He did try Cipro 250 twice daily for 10 days but the symptoms did not resolve completely.  I will switch him over to Levaquin.  I am going to consult urology and most likely have him see the urologist secondary to the fact his PSA is drifting up and he has these symptoms of chronic prostatitis.  MUSCULOSKELETAL: NEGATIVE for significant arthralgias or myalgia  NEURO: NEGATIVE for weakness, dizziness or  "paresthesias  PSYCHIATRIC: NEGATIVE for changes in mood or affect    OBJECTIVE:   /78  Pulse 97  Temp 96.4  F (35.8  C) (Tympanic)  Resp 14  Ht 5' 10.7\" (1.796 m)  Wt 189 lb 2 oz (85.8 kg)  SpO2 97%  BMI 26.6 kg/m2    Physical Exam  GENERAL: healthy, alert and no distress  NECK: no adenopathy, no asymmetry, masses, or scars and thyroid normal to palpation  RESP: lungs clear to auscultation - no rales, rhonchi or wheezes  CV: regular rate and rhythm, normal S1 S2, no S3 or S4, no murmur, click or rub, no peripheral edema and peripheral pulses strong  ABDOMEN: soft, nontender, no hepatosplenomegaly, no masses and bowel sounds normal  MS: no gross musculoskeletal defects noted, no edema  SKIN: no suspicious lesions or rashes  NEURO: Normal strength and tone, mentation intact and speech normal  BACK: no CVA tenderness, no paralumbar tenderness  PSYCH: mentation appears normal, affect normal/bright    Diagnostic Test Results:  Results for orders placed or performed in visit on 09/19/18 (from the past 24 hour(s))   GLUCOSE   Result Value Ref Range    Glucose 108 (H) 70 - 99 mg/dL   Lipid panel reflex to direct LDL Fasting   Result Value Ref Range    Cholesterol 189 <200 mg/dL    Triglycerides 155 (H) <150 mg/dL    HDL Cholesterol 39 (L) >39 mg/dL    LDL Cholesterol Calculated 119 (H) <100 mg/dL    Non HDL Cholesterol 150 (H) <130 mg/dL   HIV Screening   Result Value Ref Range    HIV Antigen Antibody Combo Nonreactive NR^Nonreactive       CBC with platelets   Result Value Ref Range    WBC 5.4 4.0 - 11.0 10e9/L    RBC Count 4.99 4.4 - 5.9 10e12/L    Hemoglobin 16.3 13.3 - 17.7 g/dL    Hematocrit 48.2 40.0 - 53.0 %    MCV 97 78 - 100 fl    MCH 32.7 26.5 - 33.0 pg    MCHC 33.8 31.5 - 36.5 g/dL    RDW 12.3 10.0 - 15.0 %    Platelet Count 175 150 - 450 10e9/L   PSA, screen   Result Value Ref Range    PSA 3.04 0 - 4 ug/L       ASSESSMENT/PLAN:   1. Routine general medical examination at a health care facility    - " "GLUCOSE    2. Screening for HIV (human immunodeficiency virus)    - HIV Screening    3. Need for prophylactic vaccination and inoculation against influenza      4. CARDIOVASCULAR SCREENING; LDL GOAL LESS THAN 160    - Lipid panel reflex to direct LDL Fasting    5. Chronic prostatitis    - CBC with platelets  - PSA, screen  Urology counts  COUNSELING:   Reviewed preventive health counseling, as reflected in patient instructions       Regular exercise       Healthy diet/nutrition    BP Readings from Last 1 Encounters:   09/19/18 124/78     Estimated body mass index is 26.6 kg/(m^2) as calculated from the following:    Height as of this encounter: 5' 10.7\" (1.796 m).    Weight as of this encounter: 189 lb 2 oz (85.8 kg).           reports that he has never smoked. He has never used smokeless tobacco.      Counseling Resources:  ATP IV Guidelines  Pooled Cohorts Equation Calculator  FRAX Risk Assessment  ICSI Preventive Guidelines  Dietary Guidelines for Americans, 2010  USDA's MyPlate  ASA Prophylaxis  Lung CA Screening    Kadeem You MD, MD  [facility]  Answers for HPI/ROS submitted by the patient on 9/19/2018   PHQ-2 Score: 2    "

## 2018-09-19 NOTE — MR AVS SNAPSHOT
After Visit Summary   9/19/2018    Kaz Moralez    MRN: [id number]           Patient Information     Date Of Birth          1965        Visit Information        Provider Department      9/19/2018 8:10 AM Kadeem You MD [facility]        Today's Diagnoses     Routine general medical examination at a health care facility    -  1    Screening for HIV (human immunodeficiency virus)        Need for prophylactic vaccination and inoculation against influenza        CARDIOVASCULAR SCREENING; LDL GOAL LESS THAN 160        Chronic prostatitis          Care Instructions      Preventive Health Recommendations  Male Ages 50 - 64    Yearly exam:             See your health care provider every year in order to  o   Review health changes.   o   Discuss preventive care.    o   Review your medicines if your doctor has prescribed any.     Have a cholesterol test every 5 years, or more frequently if you are at risk for high cholesterol/heart disease.     Have a diabetes test (fasting glucose) every three years. If you are at risk for diabetes, you should have this test more often.     Have a colonoscopy at age 50, or have a yearly FIT test (stool test). These exams will check for colon cancer.      Talk with your health care provider about whether or not a prostate cancer screening test (PSA) is right for you.    You should be tested each year for STDs (sexually transmitted diseases), if you re at risk.     Shots: Get a flu shot each year. Get a tetanus shot every 10 years.     Nutrition:    Eat at least 5 servings of fruits and vegetables daily.     Eat whole-grain bread, whole-wheat pasta and brown rice instead of white grains and rice.     Get adequate Calcium and Vitamin D.     Lifestyle    Exercise for at least 150 minutes a week (30 minutes a day, 5 days a week). This will help you control your weight and prevent disease.     Limit alcohol to one drink per day.     No smoking.     Wear  "sunscreen to prevent skin cancer.     See your dentist every six months for an exam and cleaning.     See your eye doctor every 1 to 2 years.            Follow-ups after your visit        Your next 10 appointments already scheduled     Jan 11, 2019 10:00 AM CST   Return Visit with Ashely Yates MD   [facility] ([facility]    [address] [place] [id number] [phone]              Who to contact     If you have questions or need follow up information about today's clinic visit or your schedule please contact [facility] directly at [phone].  Normal or non-critical lab and imaging results will be communicated to you by MyChart, letter or phone within 4 business days after the clinic has received the results. If you do not hear from us within 7 days, please contact the clinic through [employer] or phone. If you have a critical or abnormal lab result, we will notify you by phone as soon as possible.  Submit refill requests through [employer] or call your pharmacy and they will forward the refill request to us. Please allow 3 business days for your refill to be completed.          Additional Information About Your Visit        [employer] Information     [employer] gives you secure access to your electronic health record. If you see a primary care provider, you can also send messages to your care team and make appointments. If you have questions, please call your primary care clinic.  If you do not have a primary care provider, please call [phone] and they will assist you.        Care EveryWhere ID     This is your Care EveryWhere ID. This could be used by other organizations to access your [facility] medical records  [id number]        Your Vitals Were     Pulse Temperature Respirations Height Pulse Oximetry BMI (Body Mass Index)    97 96.4  F (35.8  C) (Tympanic) 14 5' 10.7\" (1.796 m) 97% 26.6 kg/m2       Blood Pressure from Last 3 Encounters:   09/19/18 " 124/78   02/09/18 128/74   01/12/18 129/69    Weight from Last 3 Encounters:   09/19/18 189 lb 2 oz (85.8 kg)   02/09/18 203 lb (92.1 kg)   01/12/18 194 lb (88 kg)              We Performed the Following     CBC with platelets     GLUCOSE     HIV Screening     Lipid panel reflex to direct LDL Fasting     PSA, screen        Primary Care Provider Office Phone # Fax #    Kadeem You [phone] [address] [place] [address]        Equal Access to Services     [facility]: Hadii aad ku hadasho Soomaali, waaxda luqadaha, qaybta kaalmada adeegyada, bianca ruth . So [facility] [phone].    ATENCIÓN: Si habla español, tiene a branch disposición servicios gratuitos de asistencia lingüística. Ll[facility] [phone].    We comply with applicable federal civil rights laws and [place] laws. We do not discriminate on the basis of race, color, national origin, age, disability, sex, sexual orientation, or gender identity.            Thank you!     Thank you for choosing [facility]  for your care. Our goal is always to provide you with excellent care. Hearing back from our patients is one way we can continue to improve our services. Please take a few minutes to complete the written survey that you may receive in the mail after your visit with us. Thank you!             Your Updated Medication List - Protect others around you: Learn how to safely use, store and throw away your medicines at www.disposemymeds.org.          This list is accurate as of 9/19/18  3:13 PM.  Always use your most recent med list.                   Brand Name Dispense Instructions for use Diagnosis    GLUCOSAMINE CHONDROITIN JOINT Tabs      Take by mouth daily        tamsulosin 0.4 MG capsule    FLOMAX    90 capsule    Take 1 capsule (0.4 mg) by mouth daily    Abnormality of urination       timolol 0.25 % ophthalmic solution    TIMOPTIC     Place 1 drop into both eyes every morning

## 2018-09-26 ENCOUNTER — MYC MEDICAL ADVICE (OUTPATIENT)
Dept: FAMILY MEDICINE | Facility: CLINIC | Age: 53
End: 2018-09-26

## 2018-09-27 NOTE — TELEPHONE ENCOUNTER
Had faxed the referral to Dr. Jones office on 09/20/18 and refaxed again this morning 09/27/18.   Danae ROMERO

## 2018-10-01 NOTE — TELEPHONE ENCOUNTER
Will send pt phone # to specialty to get urology appt scheduled. Referral has been placed.   Aniya Del Valle, [facility]

## 2018-10-15 ENCOUNTER — TRANSFERRED RECORDS (OUTPATIENT)
Dept: HEALTH INFORMATION MANAGEMENT | Facility: CLINIC | Age: 53
End: 2018-10-15

## 2019-01-11 ENCOUNTER — DOCUMENTATION ONLY (OUTPATIENT)
Dept: DERMATOLOGY | Facility: CLINIC | Age: 54
End: 2019-01-11

## 2019-01-11 ENCOUNTER — OFFICE VISIT (OUTPATIENT)
Dept: DERMATOLOGY | Facility: CLINIC | Age: 54
End: 2019-01-11
Payer: COMMERCIAL

## 2019-01-11 DIAGNOSIS — L57.0 ACTINIC KERATOSIS: Primary | ICD-10-CM

## 2019-01-11 PROCEDURE — 99213 OFFICE O/P EST LOW 20 MIN: CPT | Performed by: DERMATOLOGY

## 2019-01-11 RX ORDER — FLUOROURACIL 50 MG/G
CREAM TOPICAL
Qty: 40 G | Refills: 0 | Status: SHIPPED | OUTPATIENT
Start: 2019-01-11 | End: 2020-07-10

## 2019-01-11 RX ORDER — ALFUZOSIN HYDROCHLORIDE 10 MG/1
TABLET, EXTENDED RELEASE ORAL
Refills: 0 | COMMUNITY
Start: 2018-12-29 | End: 2023-07-26

## 2019-01-11 ASSESSMENT — PAIN SCALES - GENERAL: PAINLEVEL: NO PAIN (0)

## 2019-01-11 NOTE — PROGRESS NOTES
"[facility] Dermatology Note      Dermatology Problem List:  1. Family history of melanoma  2, BCC, right posterior shoulder  -s/p ED&C 9/15/2015  3. Actinic keratosis  -s/p cryotherapy    Encounter Date: Jan 11, 2019    CC:  Chief Complaint   Patient presents with     Skin Check     areas of concern on face hx BCC         History of Present Illness:  Mr. Kaz Moralez is a 53 year old male who presents as a follow-up for wart treatment. The patient was last seen 2/2/18 for treatment of a plantar wart with cryo. Today, the patient reports that other than a few dry, scratchy, flaky spots on the face - the are not the same spots he have been freezing for the last few years or so.     Past Medical History:   Patient Active Problem List   Diagnosis     CARDIOVASCULAR SCREENING; LDL GOAL LESS THAN 160     Glaucoma     Past Medical History:   Diagnosis Date     Unspecified glaucoma(365.9)     Glaucoma     No past surgical history on file.    Social History:  The patient works. Travels for work    Family History:  Mother with melanoma    Medications:  Current Outpatient Medications   Medication Sig Dispense Refill     Glucos-Chondroit-Hyaluron-MSM (GLUCOSAMINE CHONDROITIN JOINT) TABS Take by mouth daily       levofloxacin (LEVAQUIN) 500 MG tablet Take 1 tablet (500 mg) by mouth daily 10 tablet 0     tamsulosin (FLOMAX) 0.4 MG capsule Take 1 capsule (0.4 mg) by mouth daily 90 capsule 0     timolol (TIMOPTIC) 0.25 % ophthalmic solution Place 1 drop into both eyes every morning  0       Allergies   Allergen Reactions     Codeine Nausea and Vomiting     Morphine      Other reaction(s): Vomiting     Penicillins      \"Reaction when I had the mumps.\" Reports paralysis on R-side of body, \"couldn't walk for a week.\" Reports that \"to be safe\" doctor advised to avoid in future.     Seasonal Allergies        Review of Systems:  -Constitutional: The patient is feeling generally well.   -Skin: As above in HPI. No " additional skin concerns.    Physical exam:  Vitals: There were no vitals taken for this visit.  GEN: This is a well developed, well-nourished male in no acute distress, in a pleasant mood.    SKIN: Total skin excluding the undergarment areas was performed. The exam included the head/face, neck, both arms, chest, back, abdomen, both legs, digits and/or nails.   - There are erythematous macules with overyling adherent scale on the left temple, right temple, scalp.   -No other lesions of concern on areas examined.       Impression/Plan:  1. Family history of melanoma and History of nonmelanoma skin cancer      2. Actinic keratoses- right temple and scalp, we have discussed options and plan for efudex. However, we will set up some back up options just in case this is very expensive  Start Efudex twice daily for 2-3 weeks or until the onset of irritation. Discussed that we would expect irritation form this medications. Recommend the patient wash hands after use or use gloves to apply. Keep medication away from pets. Avoid sun exposure to treated area. Do not occlude treated area with bandages. Follow up 4 weeks after the last application.   Consent for PDT was obtained in case the patient changes his mind about Efudex. He may also send us a [employer] message if he ends of choose cryo       Follow-up in 6 months      Staff Involved:  Scribe/Staff      Scribe Disclosure  I, Faisal Mckeon, am serving as a scribe to document services personally performed by Dr. Ashely Yates MD, based on data collection and the provider's statements to me.     Provider Disclosure:   The documentation recorded by the scribe accurately reflects the services I personally performed and the decisions made by me.    Ashely Yates MD    Department of Dermatology  [facility]: Phone: [phone], Fax:[phone]  [facility]:  Phone: [phone], Fax: [phone]

## 2019-01-11 NOTE — PATIENT INSTRUCTIONS
Efudex Treatment    Today, you are being prescribed Fluorouracil (Efudex) a topical cream used for the treatment of Actinic Keratosis (AK's).  The medication is working to eliminate the unhealthy cells. Even though this treatment may be unattractive and somewhat uncomfortable.    Your treatment will last twice daily for 2-3 weeks or until the onset of irritation on the temples and scalp.  You may experience some mild discomfort while being treated.    You will want to stop any other creams such as glycolic acid products, retin A, Tazorac, etc. to the area. You may use bland makeup/cover-up as long as it doesn't sting or cause you discomfort.    Apply the cream at night as your physician recommends. Use a cotton-tipped applicator, or use gloves if applying it with your fingertips. If applied with unprotected fingertips, it is important to wash your hands well after you apply this medicine.     Keep this medication away from pets.    We recommend avoiding excessive sun exposure to the treated area    You may use moisturizing creams over bothersome areas such as Vanicream or Cetaphil cream if the reaction becomes too bothersome. Please, call the clinic if this occurs.   Potential Side Effects    Your treated areas may be unsightly during therapy.  This will improve slowly following the discontinuation of therapy.     During the first week of application, mild inflammation may occur.     During the following weeks, redness, and swelling may occur with some crusting and burning.     Lesions resolve as the skin exfoliates.     Over 1 to 2 weeks, new skin grows into the treatment area.    Keep this medication away from pets  Specific side effects that usually do not require medical attention (report to your doctor or health care professional if they continue or are bothersome) include: Red or dark-colored skin     Mild erosion (loss of upper layer of skin)     Mild eye irritation including burning, itching, sensitivity,  stinging, or watering     Increased sensitivity of the skin to sun and ultraviolet light     Pain and burning of the affected area     Dryness, scaling or swelling of the affected area     Skin rash, itching of the affected area     Tenderness     If you have severe pain, please, call the clinic immediately and indicate that you have pain. Ask for a call from the RN.   Who should I call with questions?     [facility]: [phone]     [facility]: [phone]     For urgent needs outside of business hours call the [facility] at [phone]  and ask for the dermatology resident on call

## 2019-01-11 NOTE — PROGRESS NOTES
Financial Counselor Review:    Procedure to be performed (include CPT code): Photodynamic Therapy - 08829    Diagnosis code (include ICD-10 code):     Medication order (include J code):Yes Levulan -     Medication dose and frequency: 1-2 Levulan Sticks    Cosmetic procedure/medication: No    Coverage and patient financial responsibility information: YES    Does patient need to be contacted by Financial Counselor: YES    Route response back to the dermatology patient care [place] - [id number]      Patient will contact clinic if he decides he would like to proceed with PDT treatment and then we can send to financial counseling to complete [place]>     Domitila Santiago LPN

## 2019-01-11 NOTE — NURSING NOTE
Kaz Moralez's goals for this visit include:   Chief Complaint   Patient presents with     Skin Check     areas of concern on face hx BCC       He requests these members of his care team be copied on today's visit information:     PCP: Kadeem You    Referring Provider:  No referring provider defined for this encounter.    There were no vitals taken for this visit.    Do you need any medication refills at today's visit? Marilyn Santiago LPN

## 2019-01-11 NOTE — LETTER
"    1/11/2019         RE: Kaz Moralez  [address] [facility] [address]        Dear Colleague,    Thank you for referring your patient, Kaz Moralez, to the [facility]. Please see a copy of my visit note below.    [facility] Dermatology Note      Dermatology Problem List:  1. Family history of melanoma  2, BCC, right posterior shoulder  -s/p ED&C 9/15/2015  3. Actinic keratosis  -s/p cryotherapy    Encounter Date: Jan 11, 2019    CC:  Chief Complaint   Patient presents with     Skin Check     areas of concern on face hx BCC         History of Present Illness:  Mr. Kaz Moralez is a 53 year old male who presents as a follow-up for wart treatment. The patient was last seen 2/2/18 for treatment of a plantar wart with cryo. Today, the patient reports that other than a few dry, scratchy, flaky spots on the face - the are not the same spots he have been freezing for the last few years or so.     Past Medical History:   Patient Active Problem List   Diagnosis     CARDIOVASCULAR SCREENING; LDL GOAL LESS THAN 160     Glaucoma     Past Medical History:   Diagnosis Date     Unspecified glaucoma(365.9)     Glaucoma     No past surgical history on file.    Social History:  The patient works. Travels for work    Family History:  Mother with melanoma    Medications:  Current Outpatient Medications   Medication Sig Dispense Refill     Glucos-Chondroit-Hyaluron-MSM (GLUCOSAMINE CHONDROITIN JOINT) TABS Take by mouth daily       levofloxacin (LEVAQUIN) 500 MG tablet Take 1 tablet (500 mg) by mouth daily 10 tablet 0     tamsulosin (FLOMAX) 0.4 MG capsule Take 1 capsule (0.4 mg) by mouth daily 90 capsule 0     timolol (TIMOPTIC) 0.25 % ophthalmic solution Place 1 drop into both eyes every morning  0       Allergies   Allergen Reactions     Codeine Nausea and Vomiting     Morphine      Other reaction(s): Vomiting     Penicillins      \"Reaction when I had the mumps.\" Reports " "paralysis on R-side of body, \"couldn't walk for a week.\" Reports that \"to be safe\" doctor advised to avoid in future.     Seasonal Allergies        Review of Systems:  -Constitutional: The patient is feeling generally well.   -Skin: As above in HPI. No additional skin concerns.    Physical exam:  Vitals: There were no vitals taken for this visit.  GEN: This is a well developed, well-nourished male in no acute distress, in a pleasant mood.    SKIN: Total skin excluding the undergarment areas was performed. The exam included the head/face, neck, both arms, chest, back, abdomen, both legs, digits and/or nails.   - There are erythematous macules with overyling adherent scale on the left temple, right temple, scalp.   -No other lesions of concern on areas examined.       Impression/Plan:  1. Family history of melanoma and History of nonmelanoma skin cancer      2. Actinic keratoses- right temple and scalp, we have discussed options and plan for efudex. However, we will set up some back up options just in case this is very expensive  Start Efudex twice daily for 2-3 weeks or until the onset of irritation. Discussed that we would expect irritation form this medications. Recommend the patient wash hands after use or use gloves to apply. Keep medication away from pets. Avoid sun exposure to treated area. Do not occlude treated area with bandages. Follow up 4 weeks after the last application.   Consent for PDT was obtained in case the patient changes his mind about Efudex. He may also send us a [employer] message if he ends of choose cryo       Follow-up in 6 months      Staff Involved:  Scribe/Staff      Scribe Disclosure  I, Faisal Mckeon, am serving as a scribe to document services personally performed by Dr. Ashely Yates MD, based on data collection and the provider's statements to me.     Provider Disclosure:   The documentation recorded by the scribe accurately reflects the services I personally performed and the decisions " made by me.    Ashely Yates MD    Department of Dermatology  [facility]: Phone: [phone], Fax:[phone]  [facility] [place]: Phone: [phone], Fax: [phone]          Again, thank you for allowing me to participate in the care of your patient.        Sincerely,        Ashely Yates MD

## 2019-02-07 ENCOUNTER — MYC MEDICAL ADVICE (OUTPATIENT)
Dept: FAMILY MEDICINE | Facility: CLINIC | Age: 54
End: 2019-02-07

## 2019-02-08 ENCOUNTER — TELEPHONE (OUTPATIENT)
Dept: FAMILY MEDICINE | Facility: CLINIC | Age: 54
End: 2019-02-08

## 2019-03-18 ENCOUNTER — MYC MEDICAL ADVICE (OUTPATIENT)
Dept: DERMATOLOGY | Facility: CLINIC | Age: 54
End: 2019-03-18

## 2019-03-26 ENCOUNTER — OFFICE VISIT (OUTPATIENT)
Dept: DERMATOLOGY | Facility: CLINIC | Age: 54
End: 2019-03-26
Payer: COMMERCIAL

## 2019-03-26 DIAGNOSIS — L57.0 ACTINIC KERATOSIS: Primary | ICD-10-CM

## 2019-03-26 PROCEDURE — 17000 DESTRUCT PREMALG LESION: CPT | Performed by: DERMATOLOGY

## 2019-03-26 ASSESSMENT — PAIN SCALES - GENERAL: PAINLEVEL: NO PAIN (0)

## 2019-03-26 NOTE — PROGRESS NOTES
[facility] Dermatology Note      Dermatology Problem List:  1. Family history of melanoma  2, BCC, right posterior shoulder  -s/p ED&C 9/15/2015  3. Actinic keratosis  -s/p cryotherapy    Encounter Date: Mar 26, 2019    CC:  Chief Complaint   Patient presents with     RECHECK     Kaz is returning to discuss SK         History of Present Illness:  Mr. Kaz Moralez is a 53 year old male who presents as a follow-up for wart treatment. The patient was last seen 1/11/19 when he started Efudex. Today the pt reports that he had a very vigorous reaction to the cream on his forehead. He is unsure if there are any residual lesions. No other concerns to address.     Past Medical History:   Patient Active Problem List   Diagnosis     CARDIOVASCULAR SCREENING; LDL GOAL LESS THAN 160     Glaucoma     Past Medical History:   Diagnosis Date     History of nonmelanoma skin cancer      Unspecified glaucoma(365.9)     Glaucoma     No past surgical history on file.    Social History:  The patient works. Travels for work    Family History:  Mother with melanoma    Medications:  Current Outpatient Medications   Medication Sig Dispense Refill     alfuzosin ER (UROXATRAL) 10 MG 24 hr tablet   0     fluorouracil (EFUDEX) 5 % external cream Apply twice daily for 2-3 weeks or until the onset of irritation to temples and scalp 40 g 0     Glucos-Chondroit-Hyaluron-MSM (GLUCOSAMINE CHONDROITIN JOINT) TABS Take by mouth daily       timolol (TIMOPTIC) 0.25 % ophthalmic solution Place 1 drop into both eyes every morning  0     levofloxacin (LEVAQUIN) 500 MG tablet Take 1 tablet (500 mg) by mouth daily (Patient not taking: Reported on 1/11/2019) 10 tablet 0     tamsulosin (FLOMAX) 0.4 MG capsule Take 1 capsule (0.4 mg) by mouth daily (Patient not taking: Reported on 1/11/2019) 90 capsule 0       Allergies   Allergen Reactions     Codeine Nausea and Vomiting     Morphine      Other reaction(s): Vomiting     Penicillins       "\"Reaction when I had the mumps.\" Reports paralysis on R-side of body, \"couldn't walk for a week.\" Reports that \"to be safe\" doctor advised to avoid in future.     Seasonal Allergies        Review of Systems:     -Skin: As above in HPI. No additional skin concerns.    Physical exam:  Vitals: There were no vitals taken for this visit.  GEN: This is a well developed, well-nourished male in no acute distress, in a pleasant mood.    SKIN: Focused exam of the face was performed.   - There is an erythematous macule with overlying adherent scale on the right temple  -No other lesions of concern on areas examined.       Impression/Plan:  1. Family history of melanoma and History of nonmelanoma skin cancer    Not addressed.     2. Actinic keratoses - s/p Efudex. Now one very small lesion today  Cryotherapy procedure note: After verbal consent and discussion of risks and benefits including but no limited to dyspigmentation/scar, blister, and pain, 1 was(were) treated with 1-2mm freeze border for 2 cycles with liquid nitrogen. Post cryotherapy instructions were provided.        Follow-up 9 months for total skin exam.       Staff Involved:  Scribe/Staff      Scribe Disclosure  I, Faisal Mckeon, am serving as a scribe to document services personally performed by Dr. Ashely Yates MD, based on data collection and the provider's statements to me.     Provider Disclosure:   The documentation recorded by the scribe accurately reflects the services I personally performed and the decisions made by me.    Ashely Yates MD    Department of Dermatology  [facility]: Phone: [phone], Fax:[phone]  [facility]: Phone: [phone], Fax: [phone]        "

## 2019-03-26 NOTE — NURSING NOTE
Kaz Moralez's goals for this visit include:   Chief Complaint   Patient presents with     LUCAS Mccurdy is returning to discuss SK       He requests these members of his care team be copied on today's visit information:     PCP: Kadeem You    Referring Provider:  No referring provider defined for this encounter.    There were no vitals taken for this visit.    Do you need any medication refills at today's visit? No  Digna Flores LPN

## 2019-03-26 NOTE — LETTER
3/26/2019         RE: Kaz Moralez  [address] [facility] [address]        Dear Colleague,    Thank you for referring your patient, Kaz Moralez, to the [facility]. Please see a copy of my visit note below.    [facility] Dermatology Note      Dermatology Problem List:  1. Family history of melanoma  2, BCC, right posterior shoulder  -s/p ED&C 9/15/2015  3. Actinic keratosis  -s/p cryotherapy    Encounter Date: Mar 26, 2019    CC:  Chief Complaint   Patient presents with     RECHECK     Kaz is returning to discuss SK         History of Present Illness:  Mr. Kaz Moralez is a 53 year old male who presents as a follow-up for wart treatment. The patient was last seen 1/11/19 when he started Efudex. Today the pt reports that he had a very vigorous reaction to the cream on his forehead. He is unsure if there are any residual lesions. No other concerns to address.     Past Medical History:   Patient Active Problem List   Diagnosis     CARDIOVASCULAR SCREENING; LDL GOAL LESS THAN 160     Glaucoma     Past Medical History:   Diagnosis Date     History of nonmelanoma skin cancer      Unspecified glaucoma(365.9)     Glaucoma     No past surgical history on file.    Social History:  The patient works. Travels for work    Family History:  Mother with melanoma    Medications:  Current Outpatient Medications   Medication Sig Dispense Refill     alfuzosin ER (UROXATRAL) 10 MG 24 hr tablet   0     fluorouracil (EFUDEX) 5 % external cream Apply twice daily for 2-3 weeks or until the onset of irritation to temples and scalp 40 g 0     Glucos-Chondroit-Hyaluron-MSM (GLUCOSAMINE CHONDROITIN JOINT) TABS Take by mouth daily       timolol (TIMOPTIC) 0.25 % ophthalmic solution Place 1 drop into both eyes every morning  0     levofloxacin (LEVAQUIN) 500 MG tablet Take 1 tablet (500 mg) by mouth daily (Patient not taking: Reported on 1/11/2019) 10 tablet 0     tamsulosin  "(FLOMAX) 0.4 MG capsule Take 1 capsule (0.4 mg) by mouth daily (Patient not taking: Reported on 1/11/2019) 90 capsule 0       Allergies   Allergen Reactions     Codeine Nausea and Vomiting     Morphine      Other reaction(s): Vomiting     Penicillins      \"Reaction when I had the mumps.\" Reports paralysis on R-side of body, \"couldn't walk for a week.\" Reports that \"to be safe\" doctor advised to avoid in future.     Seasonal Allergies        Review of Systems:     -Skin: As above in HPI. No additional skin concerns.    Physical exam:  Vitals: There were no vitals taken for this visit.  GEN: This is a well developed, well-nourished male in no acute distress, in a pleasant mood.    SKIN: Focused exam of the face was performed.   - There is an erythematous macule with overlying adherent scale on the right temple  -No other lesions of concern on areas examined.       Impression/Plan:  1. Family history of melanoma and History of nonmelanoma skin cancer    Not addressed.     2. Actinic keratoses - s/p Efudex. Now one very small lesion today  Cryotherapy procedure note: After verbal consent and discussion of risks and benefits including but no limited to dyspigmentation/scar, blister, and pain, 1 was(were) treated with 1-2mm freeze border for 2 cycles with liquid nitrogen. Post cryotherapy instructions were provided.        Follow-up 9 months for total skin exam.       Staff Involved:  Scribe/Staff      Scribe Disclosure  I, Faisal Mckeon, am serving as a scribe to document services personally performed by Dr. Ashely Yates MD, based on data collection and the provider's statements to me.     Provider Disclosure:   The documentation recorded by the scribe accurately reflects the services I personally performed and the decisions made by me.    Ashely Yates MD    Department of Dermatology  [facility]: Phone: [phone], Fax:[phone]  [facility]: Phone: [phone], Fax: [phone]          Again, thank you for allowing me to participate in the care of your patient.        Sincerely,        Ashely Yates MD

## 2019-03-26 NOTE — PATIENT INSTRUCTIONS
Cryotherapy    What is it?    Use of a very cold liquid, such as liquid nitrogen, to freeze and destroy abnormal skin cells that need to be removed    What should I expect?    Tenderness and redness    A small blister that might grow and fill with dark purple blood. There may be crusting.    More than one treatment may be needed if the lesions do not go away.    How do I care for the treated area?    Gently wash the area with your hands when bathing.    Use a thin layer of Vaseline to help with healing. You may use a Band-Aid.     The area should heal within 7-10 days and may leave behind a pink or lighter color.     Do not use an antibiotic or Neosporin ointment.     You may take acetaminophen (Tylenol) for pain.     Call your Doctor if you have:    Severe pain    Signs of infection (warmth, redness, cloudy yellow drainage, and or a bad smell)    Questions or concerns    Who should I call with questions?       [facility]: [phone]       [facility]: [phone]       For urgent needs outside of business hours call the [facility] at [phone]        and ask for the dermatology resident on call

## 2019-11-11 ENCOUNTER — TRANSFERRED RECORDS (OUTPATIENT)
Dept: HEALTH INFORMATION MANAGEMENT | Facility: CLINIC | Age: 54
End: 2019-11-11

## 2019-11-11 DIAGNOSIS — R97.20 PSA ELEVATION: ICD-10-CM

## 2019-11-11 DIAGNOSIS — R97.20 PSA ELEVATION: Primary | ICD-10-CM

## 2019-11-11 LAB — PSA SERPL-MCNC: 2.18 UG/L (ref 0–4)

## 2019-11-11 PROCEDURE — 84153 ASSAY OF PSA TOTAL: CPT | Performed by: UROLOGY

## 2019-11-11 PROCEDURE — 36415 COLL VENOUS BLD VENIPUNCTURE: CPT | Performed by: UROLOGY

## 2020-03-01 ENCOUNTER — HEALTH MAINTENANCE LETTER (OUTPATIENT)
Age: 55
End: 2020-03-01

## 2020-06-30 ENCOUNTER — MYC MEDICAL ADVICE (OUTPATIENT)
Dept: DERMATOLOGY | Facility: CLINIC | Age: 55
End: 2020-06-30

## 2020-06-30 ENCOUNTER — VIRTUAL VISIT (OUTPATIENT)
Dept: DERMATOLOGY | Facility: CLINIC | Age: 55
End: 2020-06-30
Payer: COMMERCIAL

## 2020-06-30 DIAGNOSIS — D48.5 NEOPLASM OF UNCERTAIN BEHAVIOR OF SKIN: Primary | ICD-10-CM

## 2020-06-30 PROCEDURE — 99213 OFFICE O/P EST LOW 20 MIN: CPT | Mod: 95 | Performed by: DERMATOLOGY

## 2020-06-30 ASSESSMENT — PAIN SCALES - GENERAL: PAINLEVEL: NO PAIN (0)

## 2020-06-30 NOTE — LETTER
6/30/2020         RE: Kaz Moralez  [address] [facility] [address]        Dear Colleague,    Thank you for referring your patient, Kaz Moralez, to the [facility]. Please see a copy of my visit note below.    [facility]  Store and Forward and Telephone [phone]      Impression and Recommendations (Patient Counseled on the Following):  1.Red macle with scale, right forearm- BCC or SCC or melanoma  -recommend biopsy within 2 weeks    2. HX of AKs, recommend skin exam when seen in person      Follow-up:   Follow-up with dermatology in approximately in person for biopsy and skin exam. Earlier for new or changing lesions or rash.      Staff only:    Ashely Yates MD    Department of Dermatology  [facility]: Phone: [phone], Fax:[phone]  [facility]: Phone: [phone], Fax: [phone]        _____________________________________________________________________________    Dermatology Problem List:  1. Family history of melanoma  2, BCC, right posterior shoulder  -s/p ED&C 9/15/2015  3. Actinic keratosis  -s/p cryotherapy    Encounter Date: Jun 30, 2020    CC:   Chief Complaint   Patient presents with     Derm Problem     Spot on right forearm        History of Present Illness:  I have reviewed the teledermatology information and the nursing intake corresponding to this issue. Kaz Moralez is a 55 year old male who presents via teledermatology for spot on right forearm. First noted 4 months ago.He tried the F-5u cream on it for one month. This irritated the lesion. He last applied it 3 weeks.     ROS: Patient is generally feeling well today     Physical Examination:  General: [place]  Skin: Focused examination within the teledermatology photograph(s) including forearm was performed.   -red macle with scale, right  "forearm  Labs:  NA    Past Medical History:   Patient Active Problem List   Diagnosis     CARDIOVASCULAR SCREENING; LDL GOAL LESS THAN 160     Glaucoma     Past Medical History:   Diagnosis Date     History of nonmelanoma skin cancer      Unspecified glaucoma(365.9)     Glaucoma     No past surgical history on file.    Social History:  Patient reports that he has never smoked. He has never used smokeless tobacco. He reports current alcohol use. He reports that he does not use drugs. Travels for work    Family History:  Family History   Problem Relation Age of Onset     Hypertension Mother      Cancer Mother         skin cancer-melanoma     Cardiovascular Father      Eye Disorder Father         glaucoma     Diabetes Brother      Diabetes Sister      Diabetes Sister      Mother with melanoma  Medications:  Current Outpatient Medications   Medication     alfuzosin ER (UROXATRAL) 10 MG 24 hr tablet     fluorouracil (EFUDEX) 5 % external cream     Glucos-Chondroit-Hyaluron-MSM (GLUCOSAMINE CHONDROITIN JOINT) TABS     levofloxacin (LEVAQUIN) 500 MG tablet     tamsulosin (FLOMAX) 0.4 MG capsule     timolol (TIMOPTIC) 0.25 % ophthalmic solution     No current facility-administered medications for this visit.           Allergies   Allergen Reactions     Codeine Nausea and Vomiting     Morphine      Other reaction(s): Vomiting     Penicillins      \"Reaction when I had the mumps.\" Reports paralysis on R-side of body, \"couldn't walk for a week.\" Reports that \"to be safe\" doctor advised to avoid in future.     Seasonal Allergies          _____________________________________________________________________________    Teledermatology information:  - Location of patient: [place]  - Patient presented as: return  - Location of teledermatologist:  ([facility] )  - Reason teledermatology is appropriate:  of National Emergency Regarding Coronavirus disease (COVID 19) Outbreak  - Image quality and interpretability: " "acceptable  - Physician has received verbal consent for a Video/Photos Visit from the patient? Yes  - In-person dermatology visit recommendation: yes - for biopsy  - Date of images: 6/8/2020 and 6/24/2020 and 6/30/2020  - Service start time:10:53am  - Service end time:10:58am  - Date of report: 6/30/2020         Teledermatology Nurse Call for RETURN patients seen within the last 3 years:    The patient was contacted by phone and we reviewed, \"Due to the coronavirus pandemic, we are calling to review your visit and offer you a teledermatology visit where you send in photos via [employer]. These photos will be seen by an MD or DIANE. This will be billed to you and your insurance.\"  The patient was also told that \"a teledermatology visit is not as thorough as an in-person visit and that the quality of the photograph sent may not be of the same quality as that taken by the dermatology clinic, but the patient would like to proceed with an teledermatology because of National Emergency Regarding Coronavirus disease (COVID 19) Outbreak.\"  \"If a prescription is necessary we can send it directly to your pharmacy.  If lab work is needed we can place an order for that and you can then stop by our lab to have the test done at a later time.\"    The patient understood that they may receive a call from the clinic to review additional history, may still be instructed to come to clinic even after photo review and be billed for both visits with an MD. Visits are billed at different rates depending on your insurance coverage. Please reach out to your insurance provider with any questions.They were told that a photo assessment does not replace an in person skin exam. The patient understood that teledermatology is not for urgent issues and would require up to 3 business days for review. The patient denied skin pain, fever, mucosal symptoms (lesions, blisters, sores in the mouth, nose, eyes, or genitals)  IF PATIENT ENDORSES ANY OF THESE STOP " AND PAGE  ON CALL ATTENDING. IF OTHER POSSIBLY URGENT SYMPTOMS THEN PAGE PHYSICIAN YOU ARE SCHEDULING WITH OR ON CALL IF NO ANSWER.       The patient chose to:                                                                                                                                                                                                                    Consent to a teledermatology visit with mychart photos. The patient understood they must upload a mychart photo for this visit to be completed. They indicated that the photo will be taken at their home address(if other address please document here). Patient told nursing these are already uploaded .  The patient was instructed to take photos of all all areas of concern and all areas of any rash from near and far away.                                                                                                                                                                                                                               The patient is verified to be in the state [facility] at the time of the encounter.     The physician must be notified by nurse if the patient is not in the state at the time of the encounterDELETE THIS PHRASE FROM NOTE AFTER COMPLETING IF NEEDED                                                                                                                                                                                                            Patient concerns for this return visit:   Chief Complaint   Patient presents with     Derm Problem     Spot on right forearm      The areas started as sores that were not healing. He started using Fluorouracil.   Area on bottom of forearm was raised and painful, but is healed now.   He discontinued the Fluorouracil about 3 weeks ago.   The area on top of forearm arm has not changed in shape, size or color.   The one on bottom forearm is changing in size.         Photo  instructions reviewed with patients as below:  -ALL patient needs to send photos unless they have a phone only visit approved by the clinic:  o To send photos to your doctor, respond to the message in [employer] as many times as needed to upload your photos. Each message allows for 3 photos.  o For spots or lesions of concern, please take at least 2 photos of each site you are worried about (at different angles if needed, and at least one close up and one farther away so we can tell where it is on the body. Be sure all photographs are in focus)  o For rashes, take photos of the entire body because it is important for us to see which areas are involved and which areas are not involved.  At a minimum, please include photos of the arms, legs, front of trunk, back of trunk, face, and a few close-ups of the rash.  Leave undergarments in place unless the rash involves the skin in these areas  o For acne, please take photos of the face, upper chest and neck, and upper chest and back  o For hair loss, please send views of the top of the head, sides of the head, back of the head and a picture of your face with your hair pulled back. Also, a photos of both hands with nails.    -For ADULT NEW patients video visits are needed, to receive an invitation and connect with your provider, the [employer] video visit technology must be accessible from your smartphone or personal device. Please click the link below for setup instructions: [email or web address]    Nursing tasks completed  -Pharmacy preference was updated.  -The nurse has dropped in the AVS information *(For adults the phrase is umdermhteleavs and for pediatrics it is their own) for the physician to route in the AVS.                                                                                                                                                                                                                         -The patient was told to contact the clinic if  they have not received correspondence within 72 hours.           Again, thank you for allowing me to participate in the care of your patient.        Sincerely,        Ashely Yates MD

## 2020-06-30 NOTE — PROGRESS NOTES
[facility]:  Store and Forward and Telephone [phone]      Impression and Recommendations (Patient Counseled on the Following):  1.Red macle with scale, right forearm- BCC or SCC or melanoma  -recommend biopsy within 2 weeks    2. HX of AKs, recommend skin exam when seen in person      Follow-up:   Follow-up with dermatology in approximately in person for biopsy and skin exam. Earlier for new or changing lesions or rash.      Staff only:    Ashely Yates MD    Department of Dermatology  [facility]: Phone: [phone], Fax:[phone]  [facility]: Phone: [phone], Fax: [phone]        _____________________________________________________________________________    Dermatology Problem List:  1. Family history of melanoma  2, BCC, right posterior shoulder  -s/p ED&C 9/15/2015  3. Actinic keratosis  -s/p cryotherapy    Encounter Date: Jun 30, 2020    CC:   Chief Complaint   Patient presents with     Derm Problem     Spot on right forearm        History of Present Illness:  I have reviewed the teledermatology information and the nursing intake corresponding to this issue. Kaz Moralez is a 55 year old male who presents via teledermatology for spot on right forearm. First noted 4 months ago.He tried the F-5u cream on it for one month. This irritated the lesion. He last applied it 3 weeks.     ROS: Patient is generally feeling well today     Physical Examination:  General: [place]  Skin: Focused examination within the teledermatology photograph(s) including forearm was performed.   -red macle with scale, right forearm  Labs:  NA    Past Medical History:   Patient Active Problem List   Diagnosis     CARDIOVASCULAR SCREENING; LDL GOAL LESS THAN 160     Glaucoma     Past Medical History:   Diagnosis Date     History of nonmelanoma skin cancer      Unspecified  "glaucoma(365.9)     Glaucoma     No past surgical history on file.    Social History:  Patient reports that he has never smoked. He has never used smokeless tobacco. He reports current alcohol use. He reports that he does not use drugs. Travels for work    Family History:  Family History   Problem Relation Age of Onset     Hypertension Mother      Cancer Mother         skin cancer-melanoma     Cardiovascular Father      Eye Disorder Father         glaucoma     Diabetes Brother      Diabetes Sister      Diabetes Sister      Mother with melanoma  Medications:  Current Outpatient Medications   Medication     alfuzosin ER (UROXATRAL) 10 MG 24 hr tablet     fluorouracil (EFUDEX) 5 % external cream     Glucos-Chondroit-Hyaluron-MSM (GLUCOSAMINE CHONDROITIN JOINT) TABS     levofloxacin (LEVAQUIN) 500 MG tablet     tamsulosin (FLOMAX) 0.4 MG capsule     timolol (TIMOPTIC) 0.25 % ophthalmic solution     No current facility-administered medications for this visit.           Allergies   Allergen Reactions     Codeine Nausea and Vomiting     Morphine      Other reaction(s): Vomiting     Penicillins      \"Reaction when I had the mumps.\" Reports paralysis on R-side of body, \"couldn't walk for a week.\" Reports that \"to be safe\" doctor advised to avoid in future.     Seasonal Allergies          _____________________________________________________________________________    Teledermatology information:  - Location of patient: [place]  - Patient presented as: return  - Location of teledermatologist:  [facility] )  - Reason teledermatology is appropriate:  of National Emergency Regarding Coronavirus disease (COVID 19) Outbreak  - Image quality and interpretability: acceptable  - Physician has received verbal consent for a Video/Photos Visit from the patient? Yes  - In-person dermatology visit recommendation: yes - for biopsy  - Date of images: 6/8/2020 and 6/24/2020 and 6/30/2020  - Service start time:10:53am  - " "Service end time:10:58am  - Date of report: 6/30/2020         Teledermatology Nurse Call for RETURN patients seen within the last 3 years:    The patient was contacted by phone and we reviewed, \"Due to the coronavirus pandemic, we are calling to review your visit and offer you a teledermatology visit where you send in photos via [employer]. These photos will be seen by an MD or DIANE. This will be billed to you and your insurance.\"  The patient was also told that \"a teledermatology visit is not as thorough as an in-person visit and that the quality of the photograph sent may not be of the same quality as that taken by the dermatology clinic, but the patient would like to proceed with an teledermatology because of National Emergency Regarding Coronavirus disease (COVID 19) Outbreak.\"  \"If a prescription is necessary we can send it directly to your pharmacy.  If lab work is needed we can place an order for that and you can then stop by our lab to have the test done at a later time.\"    The patient understood that they may receive a call from the clinic to review additional history, may still be instructed to come to clinic even after photo review and be billed for both visits with an MD. Visits are billed at different rates depending on your insurance coverage. Please reach out to your insurance provider with any questions.They were told that a photo assessment does not replace an in person skin exam. The patient understood that teledermatology is not for urgent issues and would require up to 3 business days for review. The patient denied skin pain, fever, mucosal symptoms (lesions, blisters, sores in the mouth, nose, eyes, or genitals)  IF PATIENT ENDORSES ANY OF THESE STOP AND PAGE  ON CALL ATTENDING. IF OTHER POSSIBLY URGENT SYMPTOMS THEN PAGE PHYSICIAN YOU ARE SCHEDULING WITH OR ON CALL IF NO ANSWER.       The patient chose to:                                                                                             "                                                                                                                        Consent to a teledermatology visit with [employer] photos. The patient understood they must upload a [employer] photo for this visit to be completed. They indicated that the photo will be taken at their home address(if other address please document here). Patient told nursing these are already uploaded .  The patient was instructed to take photos of all all areas of concern and all areas of any rash from near and far away.                                                                                                                                                                                                                               The patient is verified to be in the [facility] at the time of the encounter.     The physician must be notified by nurse if the patient is not in the state at the time of the encounterDELETE THIS PHRASE FROM NOTE AFTER COMPLETING IF NEEDED                                                                                                                                                                                                            Patient concerns for this return visit:   Chief Complaint   Patient presents with     Derm Problem     Spot on right forearm      The areas started as sores that were not healing. He started using Fluorouracil.   Area on bottom of forearm was raised and painful, but is healed now.   He discontinued the Fluorouracil about 3 weeks ago.   The area on top of forearm arm has not changed in shape, size or color.   The one on bottom forearm is changing in size.         Photo instructions reviewed with patients as below:  -ALL patient needs to send photos unless they have a phone only visit approved by the clinic:  o To send photos to your doctor, respond to the message in [employer] as many times as needed to upload your photos. Each  message allows for 3 photos.  o For spots or lesions of concern, please take at least 2 photos of each site you are worried about (at different angles if needed, and at least one close up and one farther away so we can tell where it is on the body. Be sure all photographs are in focus)  o For rashes, take photos of the entire body because it is important for us to see which areas are involved and which areas are not involved.  At a minimum, please include photos of the arms, legs, front of trunk, back of trunk, face, and a few close-ups of the rash.  Leave undergarments in place unless the rash involves the skin in these areas  o For acne, please take photos of the face, upper chest and neck, and upper chest and back  o For hair loss, please send views of the top of the head, sides of the head, back of the head and a picture of your face with your hair pulled back. Also, a photos of both hands with nails.    -For ADULT NEW patients video visits are needed, to receive an invitation and connect with your provider, the [employer] video visit technology must be accessible from your smartphone or personal device. Please click the link below for setup instructions: [email or web address]    Nursing tasks completed  -Pharmacy preference was updated.  -The nurse has dropped in the AVS information *(For adults the phrase is umdermhteleavs and for pediatrics it is their own) for the physician to route in the AVS.                                                                                                                                                                                                                         -The patient was told to contact the clinic if they have not received correspondence within 72 hours.

## 2020-06-30 NOTE — PATIENT INSTRUCTIONS
[facility] Teledermatology Visit    Thank you for allowing us to participate in your care. Your findings, instructions and follow-up plan are as follows:    Ask for gown for full skin exam      When should I call my doctor?    If you are worsening or not improving, please, contact us or seek urgent care as noted below.     Who should I call with questions (adults)?    [facility] (adult and pediatric): [phone]     [facility] (adult): [phone]    For urgent needs outside of business hours call the [facility] at [phone] and ask for the dermatology resident on call    If this is a medical emergency and you are unable to reach an ER, Call [phone]      Who should I call with questions (pediatric)?  [facility]- Pediatric Dermatology  Dr. Selam Sumner, Dr. Debbie Resendiz, Dr. Kathrine Hall, Cornelia Diaz, PA  Dr. Sophie Martins, Dr. Nicolette Goldberg & Dr. Chau Sheldon  Non Urgent  Nurse Triage Line; [phone]- Liz and Josefina LOZANO Care Coordinators   Bri (/Complex ) [phone]    If you need a prescription refill, please contact your pharmacy. Refills are approved or denied by our Physicians during normal business hours, Monday through Fridays  Per office policy, refills will not be granted if you have not been seen within the past year (or sooner depending on your child's condition)    Scheduling Information:  Pediatric Appointment Scheduling and Call Center [phone]  Radiology Scheduling- [phone]  Sedation Unit Scheduling- [phone]  [place] Scheduling- General [phone]; Pediatric Dermatology [phone]  Main  Services: [phone]  Vietnamese: [phone]  Ecuadorean: [phone]  Hmong/Yoruba/Kyrgyz: [phone]  Preadmission Nursing Department Fax Number: [phone] (Fax all pre-operative paperwork to this number)    For urgent  matters arising during evenings, weekends, or holidays that cannot wait for normal business hours please call [phone] and ask for the Dermatology Resident On-Call to be paged.

## 2020-07-10 ENCOUNTER — OFFICE VISIT (OUTPATIENT)
Dept: DERMATOLOGY | Facility: CLINIC | Age: 55
End: 2020-07-10
Payer: COMMERCIAL

## 2020-07-10 DIAGNOSIS — Z85.828 HISTORY OF NONMELANOMA SKIN CANCER: ICD-10-CM

## 2020-07-10 DIAGNOSIS — D48.5 NEOPLASM OF UNCERTAIN BEHAVIOR OF SKIN: Primary | ICD-10-CM

## 2020-07-10 PROCEDURE — 88305 TISSUE EXAM BY PATHOLOGIST: CPT | Mod: TC | Performed by: DERMATOLOGY

## 2020-07-10 PROCEDURE — 99213 OFFICE O/P EST LOW 20 MIN: CPT | Mod: 25 | Performed by: DERMATOLOGY

## 2020-07-10 PROCEDURE — 11102 TANGNTL BX SKIN SINGLE LES: CPT | Performed by: DERMATOLOGY

## 2020-07-10 PROCEDURE — 88342 IMHCHEM/IMCYTCHM 1ST ANTB: CPT | Mod: TC | Performed by: DERMATOLOGY

## 2020-07-10 ASSESSMENT — PAIN SCALES - GENERAL: PAINLEVEL: NO PAIN (1)

## 2020-07-10 NOTE — LETTER
7/10/2020         RE: Kaz Moralez  [address] [facility] [address]        Dear Colleague,    Thank you for referring your patient, Kaz Moralez, to the [facility]. Please see a copy of my visit note below.    [facility] Dermatology Record **not seen yet      Impression and Recommendations (Patient Counseled on the Following):  1.Red macle with scale, right forearm- BCC or SCC or melanoma  -Shave biopsy:  After discussion of benefits and risks including but not limited to bleeding/bruising, pain/swelling, infection, scar, incomplete removal, nerve damage/numbness, recurrence, and non-diagnostic biopsy, written consent, verbal consent and photographs were obtained. Time-out was performed. The area was cleaned with isopropyl alcohol. 0.5mL of 1% lidocaine with epinephrine was injected to obtain adequate anesthesia of the lesion on the right forearm. A shave biopsy was performed. Hemostasis was achieved with aluminium chloride. Vaseline and a sterile dressing were applied. The patient tolerated the procedure and no complications were noted. The patient was provided with verbal and written post care instructions.       2. HX of AKs and BCC    3. Papule most consistent with SK, cheek- recommend monitoring, photos in 4 months to confirm no chaningin      Follow-up:   Follow-up with dermatology in 4 months for skin check in 1 year, earlier for new or changing lesions     Staff only:    Ashely Yates MD    Department of Dermatology  [facility]: Phone: [phone], Fax:[phone]  [facility]: Phone: [phone], Fax: [phone]          _____________________________________________________________________________    Dermatology Problem List:  1. Family history of melanoma  2, BCC, right posterior shoulder  -s/p ED&C 9/15/2015  3. Actinic keratosis  -s/p  cryotherapy    Encounter Date: Jul 10, 2020    CC:   Chief Complaint   Patient presents with     Derm Problem     Bx right forearm and skin exam        History of Present Illness:  Dermatology information and the nursing intake corresponding to this issue. Kaz Moralez is a 55 year old male follow up post virutual visit for a spot check. NEeds check on the arm. Has hx of bcc. REprots lesions on face and right forearm    ROS: Patient is generally feeling well today     Physical Examination:  General: [place]  Skin:  Total skin excluding the undergarment areas was performed. The exam included the head/face, neck, both arms, chest, back, abdomen, both legs, digits and/or nails.   -red scaly patch, about 1.3cm, right forearm  -stuck on papule right cheek  Labs:  NA    Past Medical History:   Patient Active Problem List   Diagnosis     CARDIOVASCULAR SCREENING; LDL GOAL LESS THAN 160     Glaucoma     Past Medical History:   Diagnosis Date     History of nonmelanoma skin cancer      Unspecified glaucoma(365.9)     Glaucoma     No past surgical history on file.    Social History:  Patient reports that he has never smoked. He has never used smokeless tobacco. He reports current alcohol use. He reports that he does not use drugs. Travels for work    Family History:  Family History   Problem Relation Age of Onset     Hypertension Mother      Cancer Mother         skin cancer-melanoma     Cardiovascular Father      Eye Disorder Father         glaucoma     Diabetes Brother      Diabetes Sister      Diabetes Sister      Mother with melanoma  Medications:  Current Outpatient Medications   Medication     alfuzosin ER (UROXATRAL) 10 MG 24 hr tablet     Glucos-Chondroit-Hyaluron-MSM (GLUCOSAMINE CHONDROITIN JOINT) TABS     timolol (TIMOPTIC) 0.25 % ophthalmic solution     fluorouracil (EFUDEX) 5 % external cream     levofloxacin (LEVAQUIN) 500 MG tablet     tamsulosin (FLOMAX) 0.4 MG capsule     No current  "facility-administered medications for this visit.           Allergies   Allergen Reactions     Codeine Nausea and Vomiting     Morphine      Other reaction(s): Vomiting     Penicillins      \"Reaction when I had the mumps.\" Reports paralysis on R-side of body, \"couldn't walk for a week.\" Reports that \"to be safe\" doctor advised to avoid in future.     Seasonal Allergies                    Again, thank you for allowing me to participate in the care of your patient.        Sincerely,        Ashely Yates MD    "

## 2020-07-10 NOTE — PATIENT INSTRUCTIONS

## 2020-07-10 NOTE — NURSING NOTE
Kaz Moralez's goals for this visit include:   Chief Complaint   Patient presents with     Derm Problem     Bx right forearm and skin exam        He requests these members of his care team be copied on today's visit information: Yes     PCP: Kadeem You    Referring Provider:  No referring provider defined for this encounter.    There were no vitals taken for this visit.    Do you need any medication refills at today's visit? No   [id number], MEDICAL ASSISTANT

## 2020-07-10 NOTE — NURSING NOTE
Drug [employer] Record    Drug Name: Lidocaine with Epi  Dose: see MD note   Route administered: SQ  NDC #: Lidocaine with Epi (7830-8224-71)      LOT #: -[id number]  SITE: right forearm   : [employer]  EXPIRATION DATE: 01/2021    LAINE, MEDICAL ASSISTANT

## 2020-07-10 NOTE — PROGRESS NOTES
[facility] Dermatology Record **not seen yet      Impression and Recommendations (Patient Counseled on the Following):  1.Red macle with scale, right forearm- BCC or SCC or melanoma  -Shave biopsy:  After discussion of benefits and risks including but not limited to bleeding/bruising, pain/swelling, infection, scar, incomplete removal, nerve damage/numbness, recurrence, and non-diagnostic biopsy, written consent, verbal consent and photographs were obtained. Time-out was performed. The area was cleaned with isopropyl alcohol. 0.5mL of 1% lidocaine with epinephrine was injected to obtain adequate anesthesia of the lesion on the right forearm. A shave biopsy was performed. Hemostasis was achieved with aluminium chloride. Vaseline and a sterile dressing were applied. The patient tolerated the procedure and no complications were noted. The patient was provided with verbal and written post care instructions.       2. HX of AKs and BCC    3. Papule most consistent with SK, cheek- recommend monitoring, photos in 4 months to confirm no chaningin      Follow-up:   Follow-up with dermatology in 4 months for skin check in 1 year, earlier for new or changing lesions     Staff only:    Ashely Yates MD    Department of Dermatology  [facility]: Phone: [phone], Fax:[phone]  [facility]: Phone: [phone], Fax: [phone]          _____________________________________________________________________________    Dermatology Problem List:  1. Family history of melanoma  2, BCC, right posterior shoulder  -s/p ED&C 9/15/2015  3. Actinic keratosis  -s/p cryotherapy    Encounter Date: Jul 10, 2020    CC:   Chief Complaint   Patient presents with     Derm Problem     Bx right forearm and skin exam        History of Present Illness:  Dermatology information and the nursing intake corresponding to this  "issue. Kaz Moralez is a 55 year old male follow up post virutual visit for a spot check. NEeds check on the arm. Has hx of bcc. REprots lesions on face and right forearm    ROS: Patient is generally feeling well today     Physical Examination:  General: [place]  Skin:  Total skin excluding the undergarment areas was performed. The exam included the head/face, neck, both arms, chest, back, abdomen, both legs, digits and/or nails.   -red scaly patch, about 1.3cm, right forearm  -stuck on papule right cheek  Labs:  NA    Past Medical History:   Patient Active Problem List   Diagnosis     CARDIOVASCULAR SCREENING; LDL GOAL LESS THAN 160     Glaucoma     Past Medical History:   Diagnosis Date     History of nonmelanoma skin cancer      Unspecified glaucoma(365.9)     Glaucoma     No past surgical history on file.    Social History:  Patient reports that he has never smoked. He has never used smokeless tobacco. He reports current alcohol use. He reports that he does not use drugs. Travels for work    Family History:  Family History   Problem Relation Age of Onset     Hypertension Mother      Cancer Mother         skin cancer-melanoma     Cardiovascular Father      Eye Disorder Father         glaucoma     Diabetes Brother      Diabetes Sister      Diabetes Sister      Mother with melanoma  Medications:  Current Outpatient Medications   Medication     alfuzosin ER (UROXATRAL) 10 MG 24 hr tablet     Glucos-Chondroit-Hyaluron-MSM (GLUCOSAMINE CHONDROITIN JOINT) TABS     timolol (TIMOPTIC) 0.25 % ophthalmic solution     fluorouracil (EFUDEX) 5 % external cream     levofloxacin (LEVAQUIN) 500 MG tablet     tamsulosin (FLOMAX) 0.4 MG capsule     No current facility-administered medications for this visit.           Allergies   Allergen Reactions     Codeine Nausea and Vomiting     Morphine      Other reaction(s): Vomiting     Penicillins      \"Reaction when I had the mumps.\" Reports paralysis on R-side of body, " "\"couldn't walk for a week.\" Reports that \"to be safe\" doctor advised to avoid in future.     Seasonal Allergies                  "

## 2020-07-12 ENCOUNTER — VIRTUAL VISIT (OUTPATIENT)
Dept: FAMILY MEDICINE | Facility: OTHER | Age: 55
End: 2020-07-12

## 2020-07-12 ENCOUNTER — NURSE TRIAGE (OUTPATIENT)
Dept: NURSING | Facility: CLINIC | Age: 55
End: 2020-07-12

## 2020-07-13 NOTE — PROGRESS NOTES
"Date: 2020 19:11:39  Clinician: Callie Baldwin  Clinician NPI: [id number]  Patient: Kaz Moralez  Patient : 1965  Patient Address: [address] [place], [place] [address]  Patient Phone: [phone]  Visit Protocol: General skin conditions  Patient Summary:  Kaz is a 55 year old ( : 1965 ) male who initiated a Visit for evaluation of an unspecified skin condition. When asked the question \"Please sign me up to receive news, health information and promotions. \", Kaz responded \"No\".    Images of his skin condition were uploaded.  His symptoms started 1-3 days ago and affect the right side of his body. The skin condition is located on his abdomen. The skin condition is red in color.   The affected area has sores. It feels itchy, warm to touch, and burning. The symptoms interfere with his sleep. Kaz also feels feverish.   Symptom details     Redness: The redness has not rapidly increased in size.    Temperature: His current temperature is 99.5 degrees Fahrenheit.      The skin condition has changed since the symptoms started. Description of changes as reported by the patient (free text): It started as a small blood blister 36 hours ago and now the red area is about 5 inches wide and 2 inches tall and it itches like crazy.   Denied symptoms include blisters, pain, pimples, crusts, hives, tender to touch, scabs, numbness, dry/flaky skin, and drainage. Kaz He does not have a rash in the shape of a bull's-eye.   Treatments or home remedies used to relieve the symptoms as reported by the patient (free text): I have applied a topical anti itch medicine and it provide relief for about 90 minutes.   Precipitating events   Kaz did not come in contact with any irritants prior to the onset of his symptoms and has not been in close contact with anyone that has similar symptoms. He also did not spend time in a wooded area, swim, travel, or spend excess time in the sun just before his symptoms " started. Kaz is not sure if he was bitten or stung by an insect.   Pertinent medical history  Kaz has not experienced this skin condition before.   Kaz has had chickenpox and has had shingles in the past. He received a shingles vaccine.   He has ongoing medical conditions. 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   Kaz does not have a history of atopia.   Kaz does not need a return to work/school note.   Weight: 190 lbs   Kaz does not smoke or use smokeless tobacco.   Weight: 190 lbs    MEDICATIONS: alfuzosin oral, timolol ophthalmic (eye), ALLERGIES: Penicillins, Opioids - Morphine Analogues  Clinician Response:  Dear Kaz,   Your health is our priority. To determine the most appropriate care for you, I would like you to be seen in person to further discuss your health history and symptoms.  You will not be charged for this Visit. Thank you for trusting us with your care.   Diagnosis: Refer for additional evaluation  Diagnosis ICD: R69

## 2020-07-13 NOTE — TELEPHONE ENCOUNTER
Kaz got bitten but some bug on the back at waist line.  It was somewhat painful. This happened last night.  Today he has a rash that is 7 inches by 2 inches that is red and itchy. Per protocol he could be seen tomorrow.   He wonders if he should be seen tonight.  Per protocol I am not seeing that he needs to do so but if the rash gets bigger or he spikes a fever or any other symptoms that concerns him change he can go to ER anytime.  For now he is ok to wait and see if things improve overnight. Gave him home care measures for itchiness.  He will also black the size of the rash so he can know if it gets bigger or smaller.    He agrees to these plans.    Additional Information    [1] Red or very tender (to touch) area AND [2] started over 24 hours after the bite    [1] SEVERE local itching (i.e., interferes with work, school, sleep) AND [2] not improved after 24 hours of hydrocortisone cream    Spider bite(s)    Negative: [1] Severe bite pain AND [2] not improved after 2 hours of pain medicine    Negative: [1] Fever AND [2] red area    Negative: [1] Fever AND [2] area is very tender to touch    Negative: [1] Red streak or red line AND [2] length > 2 inches (5 cm)    Negative: Patient sounds very sick or weak to the triager    Negative: Bee sting(s)    Negative: Tick bite(s)    Negative: Mosquito bite(s)    Negative: Bed bug bite(s)    Negative: Boil suspected (i.e., painful red lump and NO insect bite)    Negative: Doesn't sound like an insect bite    Negative: [1] Life-threatening reaction (anaphylaxis) in the past to same insect bite AND [2] < 2 hours since bite    Negative: Passed out (i.e., lost consciousness, collapsed and was not responding)    Negative: Difficulty breathing or wheezing    Negative: [1] Hoarseness or cough AND [2] sudden onset following bite    Negative: [1] Difficulty swallowing or slurred speech AND [2] sudden onset following bite    Negative: Sounds like a life-threatening emergency to the  triager    Protocols used: INSECT BITE-A-AH

## 2020-07-15 LAB — COPATH REPORT: NORMAL

## 2020-07-29 ENCOUNTER — TELEPHONE (OUTPATIENT)
Dept: DERMATOLOGY | Facility: CLINIC | Age: 55
End: 2020-07-29

## 2020-07-29 NOTE — TELEPHONE ENCOUNTER
7/29 Provided phone number [phone] to schedule in about 4 months (around 11/10/2020).    Karolyn Kaye   Procedure    Ortho/Sports Med/Pod/Ent/Eye/Surgical Specialties  [facility] [place]   [phone]

## 2020-12-13 ENCOUNTER — HEALTH MAINTENANCE LETTER (OUTPATIENT)
Age: 55
End: 2020-12-13

## 2021-01-06 ENCOUNTER — MYC MEDICAL ADVICE (OUTPATIENT)
Dept: FAMILY MEDICINE | Facility: CLINIC | Age: 56
End: 2021-01-06

## 2021-01-15 ENCOUNTER — OFFICE VISIT (OUTPATIENT)
Dept: FAMILY MEDICINE | Facility: CLINIC | Age: 56
End: 2021-01-15
Payer: COMMERCIAL

## 2021-01-15 VITALS
TEMPERATURE: 98.1 F | HEIGHT: 70 IN | HEART RATE: 102 BPM | DIASTOLIC BLOOD PRESSURE: 74 MMHG | RESPIRATION RATE: 18 BRPM | OXYGEN SATURATION: 97 % | WEIGHT: 200 LBS | SYSTOLIC BLOOD PRESSURE: 128 MMHG | BODY MASS INDEX: 28.63 KG/M2

## 2021-01-15 DIAGNOSIS — M77.11 LATERAL EPICONDYLITIS OF RIGHT ELBOW: Primary | ICD-10-CM

## 2021-01-15 PROCEDURE — 99213 OFFICE O/P EST LOW 20 MIN: CPT | Performed by: FAMILY MEDICINE

## 2021-01-15 ASSESSMENT — PAIN SCALES - GENERAL: PAINLEVEL: MILD PAIN (2)

## 2021-01-15 ASSESSMENT — MIFFLIN-ST. JEOR: SCORE: 1738.44

## 2021-01-15 NOTE — PATIENT INSTRUCTIONS
Patient Education     Understanding Lateral Epicondylitis     Tendons are strong bands of tissue that connect muscles to bones. Lateral epicondylitis affects the tendons that connect muscles in the forearm to the lateral epicondyle. This is the bony knob on the outer side of the elbow. The condition occurs if the extensor tendons of the wrist become painful and swollen (irritated). This can cause pain in the elbow, forearm, and wrist. Because the condition is sometimes caused by playing tennis, it's also known as  tennis elbow.     How to say it  LA-tuhr-elvis iv-iu-SEH-duh-LY-tis   What causes lateral epicondylitis?  The condition most often occurs because of overuse. This can be from any activity that repeatedly puts stress on the forearm extensor muscles or tendons and wrist. For instance, playing tennis, lifting weights, cutting meat, painting, and typing can all cause the condition. Wear and tear of the tendons from aging or an injury to the tendons can also cause the condition.   Symptoms of lateral epicondylitis  The most common symptom is pain. You may feel it on the outer side of the elbow and down the back of the forearm. It may be worse when moving or using the elbow, forearm, or wrist. You may also feel pain when gripping or lifting things.   Treatment for lateral epicondylitis  Treatments may include:    Resting the elbow, forearm, and wrist. You ll need to avoid movements that can make your symptoms worse. You also may need to avoid certain sports and types of work for a time. This helps relieve symptoms and prevent further damage to the tendons.    Changing the action that caused the problem. For instance, if the tendons were damaged from playing tennis, it may help to change your playing technique or use different equipment. This helps prevent further damage to the tendons.    Using cold packs. Putting an ice pack on the injured area can help reduce pain and swelling.    Taking pain medicines. Taking  prescription or over-the-counter pain medicines may help reduce pain and swelling.      Wearing a brace. This helps reduce strain on the muscles and tendons in the forearm, which may relieve symptoms. It's very important to wear the brace properly.    Doing exercises and physical therapy. These help improve strength and range of motion in the elbow, forearm, and wrist.    Getting shots of medicine into the injured area. These may help relieve symptoms for a time.    Having surgery. This may be an option if other treatments fail to relieve symptoms. In many cases, the surgeon removes the damaged tissue.  Possible complications of lateral epicondylitis  If the tendons involved don t heal properly, symptoms may return or get worse. To help prevent this, follow your treatment plan as directed.   When to call your healthcare provider  Call your healthcare provider right away if you have any of these:    Fever of 100.4 F (38 C) or higher, or as directed by your provider    Chills    Redness, swelling, or warmth in the elbow or forearm that gets worse    Symptoms that don t get better with treatment, or get worse    New symptoms  Jose last reviewed this educational content on 6/1/2019 2000-2020 [employer]. [address] [place] [address]. All rights reserved. This information is not intended as a substitute for professional medical care. Always follow your healthcare professional's instructions.           Patient Education     Understanding Lateral Epicondylitis     Tendons are strong bands of tissue that connect muscles to bones. Lateral epicondylitis affects the tendons that connect muscles in the forearm to the lateral epicondyle. This is the bony knob on the outer side of the elbow. The condition occurs if the extensor tendons of the wrist become painful and swollen (irritated). This can cause pain in the elbow, forearm, and wrist. Because the condition is sometimes caused by playing  tennis, it's also known as  tennis elbow.     How to say it  LA-tuhr-elvis [id number]-duh-LY-tis   What causes lateral epicondylitis?  The condition most often occurs because of overuse. This can be from any activity that repeatedly puts stress on the forearm extensor muscles or tendons and wrist. For instance, playing tennis, lifting weights, cutting meat, painting, and typing can all cause the condition. Wear and tear of the tendons from aging or an injury to the tendons can also cause the condition.   Symptoms of lateral epicondylitis  The most common symptom is pain. You may feel it on the outer side of the elbow and down the back of the forearm. It may be worse when moving or using the elbow, forearm, or wrist. You may also feel pain when gripping or lifting things.   Treatment for lateral epicondylitis  Treatments may include:    Resting the elbow, forearm, and wrist. You ll need to avoid movements that can make your symptoms worse. You also may need to avoid certain sports and types of work for a time. This helps relieve symptoms and prevent further damage to the tendons.    Changing the action that caused the problem. For instance, if the tendons were damaged from playing tennis, it may help to change your playing technique or use different equipment. This helps prevent further damage to the tendons.    Using cold packs. Putting an ice pack on the injured area can help reduce pain and swelling.    Taking pain medicines. Taking prescription or over-the-counter pain medicines may help reduce pain and swelling.  600-800 mg of ibuprofen every 8 hours.  Take omeprazole 20 mg over the counter while on this.    Wearing a brace. This helps reduce strain on the muscles and tendons in the forearm, which may relieve symptoms. It's very important to wear the brace properly.    Doing exercises and physical therapy. These help improve strength and range of motion in the elbow, forearm, and wrist.    Getting shots of medicine  into the injured area. These may help relieve symptoms for a time.    Having surgery. This may be an option if other treatments fail to relieve symptoms. In many cases, the surgeon removes the damaged tissue.  Possible complications of lateral epicondylitis  If the tendons involved don t heal properly, symptoms may return or get worse. To help prevent this, follow your treatment plan as directed.   When to call your healthcare provider  Call your healthcare provider right away if you have any of these:    Fever of 100.4 F (38 C) or higher, or as directed by your provider    Chills    Redness, swelling, or warmth in the elbow or forearm that gets worse    Symptoms that don t get better with treatment, or get worse    New symptoms  Jose last reviewed this educational content on 6/1/2019 2000-2020 [employer]. [address] [place] [address], [place] [address]. All rights reserved. This information is not intended as a substitute for professional medical care. Always follow your healthcare professional's instructions.

## 2021-01-15 NOTE — PROGRESS NOTES
"  Assessment & Plan       ASSESSMENT/ORDERS:    ICD-10-CM    1. Lateral epicondylitis of right elbow  M77.11      PLAN:  1.  History and physical exam are consistent with later epicondylitis. Patient was provided with a tennis elbow brace. He was instructed to take scheduled 800 mg Ibuprofen every 8 hours for the next 10-14 days and ice the area if needed. Discussed with the patient to participate in activity as tolerated. If this is not improving in 2-3 weeks, then he can contact me and set him up for PT.        BMI:   Estimated body mass index is 28.7 kg/m  as calculated from the following:    Height as of this encounter: 1.778 m (5' 10\").    Weight as of this encounter: 90.7 kg (200 lb).   Weight management plan: not discussed at this visit.      MEDICATIONS:        - Ibuprofen 800 mg q8 hr for the next 10-14 days   CONSULTATION/REFERRAL to physical therapy if not improving.     Return in about 4 weeks (around 2/12/2021) for recheck if symptoms worsen or fail to improve.    Patient was seen and examined by myself and Luis Felipe Parada MD. The note was then scribed by me.     Shelby Solano, MS3  January 15, 2021    This patient was seen and examined by myself as well as the medical student.  The medical student scribed the note and I have reviewed it, edited it appropriately, and agree with the final documentation.     Luis Felipe Parada MD  [facility] FRANDY Mccurdy is a 55 year old who presents to clinic today for the following health issues:     HPI       Musculoskeletal problem/pain  Onset/Duration: 5 months   Description  Location: elbow - right  Joint Swelling: no  Redness: no  Pain: YES  Warmth: no  Intensity:  moderate  Progression of Symptoms:  same and constant  Accompanying signs and symptoms:   Fevers: no  Numbness/tingling/weakness: no  History  Trauma to the area: no  Recent illness:  no  Previous similar problem: no  Previous evaluation:  no  Precipitating or " "alleviating factors:  Aggravating factors include: lifting, exercise and overuse  Therapies tried and outcome: rest/inactivity, heat, ice, massage, stretching, acetaminophen and Ibuprofen    Patient reports that approximately 4 months ago he was pounding in fence posts and he started to have pain at his right lateral elbow. This has continued to bother him, is worse with use and now he cannot  a glass when his hand is pronated because of the pain. He has been taking Ibuprofen and Tylenol occasionally, which helps temporarily. He also is a  and doing some of those moves has been hurting his elbow.             Review of Systems   Constitutional, HEENT, cardiovascular, pulmonary, gi and gu systems are negative, except as otherwise noted.      Objective    /74   Pulse 102   Temp 98.1  F (36.7  C) (Temporal)   Resp 18   Ht 1.778 m (5' 10\")   Wt 90.7 kg (200 lb)   SpO2 97%   BMI 28.70 kg/m    Body mass index is 28.7 kg/m .  Physical Exam   GENERAL: healthy, alert and no distress  HEENT: head atraumatic and normocephalic   MSK: pain with extension of right wrist against resistance, no pain with flexion against resistance, radial or ulnar deviation. Tenderness to palpation to the lateral epicondyle.   NEURO: full sensation of upper extremities equal and symmetric. Normal gait.  PSYCH: mentation appears normal, affect normal/bright                "

## 2021-04-17 ENCOUNTER — HEALTH MAINTENANCE LETTER (OUTPATIENT)
Age: 56
End: 2021-04-17

## 2021-04-23 ENCOUNTER — IMMUNIZATION (OUTPATIENT)
Dept: FAMILY MEDICINE | Facility: CLINIC | Age: 56
End: 2021-04-23
Payer: COMMERCIAL

## 2021-04-23 PROCEDURE — 0011A PR COVID VAC MODERNA 100 MCG/0.5 ML IM: CPT

## 2021-04-23 PROCEDURE — 91301 PR COVID VAC MODERNA 100 MCG/0.5 ML IM: CPT

## 2021-05-21 ENCOUNTER — IMMUNIZATION (OUTPATIENT)
Dept: FAMILY MEDICINE | Facility: CLINIC | Age: 56
End: 2021-05-21
Attending: FAMILY MEDICINE
Payer: COMMERCIAL

## 2021-05-21 PROCEDURE — 91301 PR COVID VAC MODERNA 100 MCG/0.5 ML IM: CPT

## 2021-05-21 PROCEDURE — 0012A PR COVID VAC MODERNA 100 MCG/0.5 ML IM: CPT

## 2021-10-02 ENCOUNTER — HEALTH MAINTENANCE LETTER (OUTPATIENT)
Age: 56
End: 2021-10-02

## 2021-12-06 ENCOUNTER — E-VISIT (OUTPATIENT)
Dept: URGENT CARE | Facility: CLINIC | Age: 56
End: 2021-12-06
Payer: COMMERCIAL

## 2021-12-06 DIAGNOSIS — Z20.822 CLOSE EXPOSURE TO 2019 NOVEL CORONAVIRUS: Primary | ICD-10-CM

## 2021-12-06 PROCEDURE — 99421 OL DIG E/M SVC 5-10 MIN: CPT | Performed by: FAMILY MEDICINE

## 2021-12-06 NOTE — PATIENT INSTRUCTIONS
"  Dear Kaz Moralez,    Based on your exposure to COVID-19 (coronavirus), we would like to test you for this virus. I have placed an order for this test.The best time for testing is 5-7 days after the exposure.    How to schedule:  Go to your [facility] home page and scroll down to the section that says  You have an appointment that needs to be scheduled  and click the large green button that says  Schedule Now  and follow the steps to find the next available opening.     If you are unable to complete these [facility] scheduling steps, please call [phone] to schedule your testing.     Return to work/school/ guidance:   For people with high risk exposures outside the home    Please let your workplace manager and staffing office know when your quarantine ends.     We can not give you an exact date as it depends on the information below. You can calculate this on your own or work with your manager/staffing office to calculate this. (For example if you were exposed on 10/4, you would have to quarantine for 14 full days. That would be through 10/18. You could return on 10/19.)    Quarantine Guidelines:  Patients (\"contacts\") who have been in close prolonged contact of an infected person(s) (within six feet for at least 15 minutes within a 24 hour period), and remain asymptomatic should enter quarantine based on the following options:    14-day quarantine period (this remains the CDC recommendation for the greatest protection against spread of COVID-19) OR    Minimum 7-day quarantine with negative RT-PCR test collected on day 5 or later OR    10-day quarantine with no test  Quarantine Guideline exceptions are as follows:    People who have been fully vaccinated do not need to quarantine if the exposure was at least 2 weeks after the last vaccination. This includes vaccinated health care workers.    Not fully vaccinated and unvaccinated Individuals who work in health care, congregate care, or congregate living " should be off work for 14 days from their last date of exposure. Community activities for this group can be resumed based on options above. Fully vaccinated individuals in this group do not need to quarantine from work after exposure.    Not fully vaccinated and unvaccinated people whose high-risk exposure was a household member should always quarantine for 14 days from their last date of exposure. Fully vaccinated people in this category do not need to quarantine.    Not fully vaccinated or unvaccinated residents of congregate care and congregate living settings should always quarantine for 14 days from their last date of exposure. Fully vaccinated residents do not need to quarantine.  Note: If you have ongoing exposure to the covid positive person, this quarantine period may be more than 14 days. (For example, if you are continued to be exposed to your child who tested positive and cannot isolate from them, then the quarantine of 7-14 days can't start until your child is no longer contagious. This is typically 10 days from onset of the child's symptoms. So the total duration may be 17-24 days in this case.)    You should continue symptom monitoring until day 14 post-exposure. If you develop signs or symptoms of COVID-19, isolate and get tested (even if you have been tested already).    How to quarantine:   Stay home and away from others. Don't go to school or anywhere else. Generally quarantine means staying home from work but there are some exceptions to this. Please contact your workplace.  No hugging, kissing or shaking hands.  Don't let anyone visit.  Cover your mouth and nose with a mask, tissue or washcloth to avoid spreading germs.  Wash your hands and face often. Use soap and water.    What are the symptoms of COVID-19?  The most common symptoms are cough, fever and trouble breathing. Less common symptoms include headache, body aches, fatigue (feeling very tired), chills, sore throat, stuffy or runny nose,  diarrhea (loose poop), loss of taste or smell, belly pain, and nausea or vomiting (feeling sick to your stomach or throwing up).  After 14 days, if you have still don't have symptoms, you likely don't have this virus.  If you develop symptoms, follow these guidelines.  If you're normally healthy: Please start another eVisit.  If you have a serious health problem (like cancer, heart failure, an organ transplant or kidney disease): Call your specialty clinic. Let them know that you might have COVID-19.    Where can I get more information?  [facility] - About COVID-19: [email or web address]  CDC - What to Do If You're Sick: www.cdc.gov/coronavirus/2019-ncov/about/steps-when-sick.html  CDC - Ending Home Isolation: www.cdc.gov/coronavirus/2019-ncov/hcp/disposition-in-home-patients.html  CDC - Caring for Someone: www.cdc.gov/coronavirus/2019-ncov/if-you-are-sick/care-for-someone.html  [facility] clinical trials (COVID-19 research studies): [email or web address].[facility]-clinical-trials  Below are the COVID-19 hotlines at the [place] [facility] ([facility]). Interpreters are available.  For health questions: Call 185-485-8361 or 1-406.595.3458 (7 a.m. to 7 p.m.)  For questions about schools and childcare: Call 782-828-5920 or 1-636.508.1019 (7 a.m. to 7 p.m.)

## 2022-05-08 ENCOUNTER — HEALTH MAINTENANCE LETTER (OUTPATIENT)
Age: 57
End: 2022-05-08

## 2022-09-09 ENCOUNTER — MYC MEDICAL ADVICE (OUTPATIENT)
Dept: FAMILY MEDICINE | Facility: CLINIC | Age: 57
End: 2022-09-09

## 2022-09-13 ENCOUNTER — OFFICE VISIT (OUTPATIENT)
Dept: FAMILY MEDICINE | Facility: CLINIC | Age: 57
End: 2022-09-13
Payer: COMMERCIAL

## 2022-09-13 VITALS
RESPIRATION RATE: 16 BRPM | DIASTOLIC BLOOD PRESSURE: 86 MMHG | OXYGEN SATURATION: 98 % | TEMPERATURE: 97.4 F | BODY MASS INDEX: 27.63 KG/M2 | WEIGHT: 193 LBS | SYSTOLIC BLOOD PRESSURE: 122 MMHG | HEIGHT: 70 IN | HEART RATE: 89 BPM

## 2022-09-13 DIAGNOSIS — Z13.6 CARDIOVASCULAR SCREENING; LDL GOAL LESS THAN 160: ICD-10-CM

## 2022-09-13 DIAGNOSIS — Z12.5 SCREENING FOR PROSTATE CANCER: Primary | ICD-10-CM

## 2022-09-13 DIAGNOSIS — Z83.3 FAMILY HISTORY OF DIABETES MELLITUS: ICD-10-CM

## 2022-09-13 LAB
CHOLEST SERPL-MCNC: 206 MG/DL
FASTING STATUS PATIENT QL REPORTED: YES
HBA1C MFR BLD: 5.6 % (ref 0–5.6)
HDLC SERPL-MCNC: 45 MG/DL
LDLC SERPL CALC-MCNC: 129 MG/DL
NONHDLC SERPL-MCNC: 161 MG/DL
PSA SERPL-MCNC: 5.15 UG/L (ref 0–4)
TRIGL SERPL-MCNC: 161 MG/DL

## 2022-09-13 PROCEDURE — 83036 HEMOGLOBIN GLYCOSYLATED A1C: CPT | Performed by: FAMILY MEDICINE

## 2022-09-13 PROCEDURE — 80061 LIPID PANEL: CPT | Performed by: FAMILY MEDICINE

## 2022-09-13 PROCEDURE — G0103 PSA SCREENING: HCPCS | Performed by: FAMILY MEDICINE

## 2022-09-13 PROCEDURE — 36415 COLL VENOUS BLD VENIPUNCTURE: CPT | Performed by: FAMILY MEDICINE

## 2022-09-13 PROCEDURE — 99396 PREV VISIT EST AGE 40-64: CPT | Performed by: FAMILY MEDICINE

## 2022-09-13 ASSESSMENT — PAIN SCALES - GENERAL: PAINLEVEL: MILD PAIN (3)
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
## 2022-09-14 ENCOUNTER — MYC MEDICAL ADVICE (OUTPATIENT)
Dept: FAMILY MEDICINE | Facility: CLINIC | Age: 57
End: 2022-09-14

## 2022-09-14 DIAGNOSIS — N41.0 ACUTE PROSTATITIS: Primary | ICD-10-CM

## 2022-09-14 RX ORDER — CIPROFLOXACIN 500 MG/1
500 TABLET, FILM COATED ORAL 2 TIMES DAILY
Qty: 40 TABLET | Refills: 0 | Status: SHIPPED | OUTPATIENT
Start: 2022-09-14 | End: 2023-07-26

## 2023-01-14 ENCOUNTER — HEALTH MAINTENANCE LETTER (OUTPATIENT)
Age: 58
End: 2023-01-14

## 2023-06-21 ENCOUNTER — MYC MEDICAL ADVICE (OUTPATIENT)
Dept: FAMILY MEDICINE | Facility: CLINIC | Age: 58
End: 2023-06-21
Payer: COMMERCIAL

## 2023-07-26 ENCOUNTER — OFFICE VISIT (OUTPATIENT)
Dept: INTERNAL MEDICINE | Facility: CLINIC | Age: 58
End: 2023-07-26
Payer: COMMERCIAL

## 2023-07-26 VITALS
HEART RATE: 60 BPM | BODY MASS INDEX: 27.69 KG/M2 | TEMPERATURE: 98 F | SYSTOLIC BLOOD PRESSURE: 116 MMHG | WEIGHT: 193 LBS | DIASTOLIC BLOOD PRESSURE: 78 MMHG | OXYGEN SATURATION: 100 %

## 2023-07-26 DIAGNOSIS — H25.9 AGE-RELATED CATARACT OF BOTH EYES, UNSPECIFIED AGE-RELATED CATARACT TYPE: Primary | ICD-10-CM

## 2023-07-26 DIAGNOSIS — Z01.818 PREOP GENERAL PHYSICAL EXAM: ICD-10-CM

## 2023-07-26 PROCEDURE — 99214 OFFICE O/P EST MOD 30 MIN: CPT | Performed by: INTERNAL MEDICINE

## 2023-07-26 NOTE — PATIENT INSTRUCTIONS
[address] [address]  Phone: [phone]  Primary Provider: Kadeem You  Pre-op Performing Provider: BEVERLEY NIEVES      PREOPERATIVE EVALUATION:  Today's date: 7/26/2023    Kaz Moralze is a 58 year old male who presents for a preoperative evaluation.      7/26/2023     9:34 AM   Additional Questions   Roomed by Britany crawley     Surgical Information:  Surgery/Procedure: Cataract   Surgery Location: [facility]   Surgeon: Dr Hampton   Surgery Date: 7/27/2023 Right eye  8/3/2023 Left Eye   Time of Surgery:   Where patient plans to recover: At home with family  Fax number for surgical facility: [phone]    Assessment & Plan     The proposed surgical procedure is considered LOW risk.    Age-related cataract of both eyes, unspecified age-related cataract type              - No identified additional risk factors other than previously addressed    Antiplatelet or Anticoagulation Medication Instructions:   - Patient is on no antiplatelet or anticoagulation medications.    Additional Medication Instructions:  Patient is on no additional chronic medications    RECOMMENDATION:  APPROVAL GIVEN to proceed with proposed procedure, without further diagnostic evaluation.          Subjective       HPI related to upcoming procedure: Patient has bilateral cataracts which are now interfering with his ability Tarah like to use of daily living.        7/19/2023     9:28 AM   Preop Questions   1. Have you ever had a heart attack or stroke? No   2. Have you ever had surgery on your heart or blood vessels, such as a stent placement, a coronary artery bypass, or surgery on an artery in your head, neck, heart, or legs? No   3. Do you have chest pain with activity? No   4. Do you have a history of  heart failure? No   5. Do you currently have a cold, bronchitis or symptoms of other infection? No   6. Do you have a cough, shortness of breath, or  wheezing? No   7. Do you or anyone in your family have previous history of blood clots? No   8. Do you or does anyone in your family have a serious bleeding problem such as prolonged bleeding following surgeries or cuts? No   9. Have you ever had problems with anemia or been told to take iron pills? No   10. Have you had any abnormal blood loss such as black, tarry or bloody stools? No   11. Have you ever had a blood transfusion? No   12. Are you willing to have a blood transfusion if it is medically needed before, during, or after your surgery? Yes   13. Have you or any of your relatives ever had problems with anesthesia? YES -    14. Do you have sleep apnea, excessive snoring or daytime drowsiness? No   15. Do you have any artifical heart valves or other implanted medical devices like a pacemaker, defibrillator, or continuous glucose monitor? No   16. Do you have artificial joints? No   17. Are you allergic to latex? No       Health Care Directive:  Patient does not have a Health Care Directive or Living Will: Discussed advance care planning with patient; however, patient declined at this time.    Preoperative Review of :   reviewed - no record of controlled substances prescribed.          Review of Systems  CONSTITUTIONAL: NEGATIVE for fever, chills, change in weight  INTEGUMENTARY/SKIN: NEGATIVE for worrisome rashes, moles or lesions  EYES: Decreased visual acuity consistent with the presence of bilateral cataract.  ENT/MOUTH: NEGATIVE for ear, mouth and throat problems  RESP: NEGATIVE for significant cough or SOB  CV: NEGATIVE for chest pain, palpitations or peripheral edema  GI: NEGATIVE for nausea, abdominal pain, heartburn, or change in bowel habits  : NEGATIVE for frequency, dysuria, or hematuria  MUSCULOSKELETAL: NEGATIVE for significant arthralgias or myalgia  NEURO: NEGATIVE for weakness, dizziness or paresthesias  ENDOCRINE: NEGATIVE for temperature intolerance, skin/hair changes  HEME: NEGATIVE  "for bleeding problems  PSYCHIATRIC: NEGATIVE for changes in mood or affect    Patient Active Problem List    Diagnosis Date Noted    History of nonmelanoma skin cancer 07/10/2020     Priority: Medium    Glaucoma      Priority: Medium     Glaucoma  Problem list name updated by automated process. Provider to review      CARDIOVASCULAR SCREENING; LDL GOAL LESS THAN 160 10/31/2010     Priority: Medium      Past Medical History:   Diagnosis Date    History of nonmelanoma skin cancer     Unspecified glaucoma(365.9)     Glaucoma     No past surgical history on file.  Current Outpatient Medications   Medication Sig Dispense Refill    Glucos-Chondroit-Hyaluron-MSM (GLUCOSAMINE CHONDROITIN JOINT) TABS Take by mouth daily      timolol (TIMOPTIC) 0.25 % ophthalmic solution Place 1 drop into both eyes every morning  0    alfuzosin ER (UROXATRAL) 10 MG 24 hr tablet   0    ciprofloxacin (CIPRO) 500 MG tablet Take 1 tablet (500 mg) by mouth 2 times daily 40 tablet 0       Allergies   Allergen Reactions    Codeine Nausea and Vomiting    Morphine      Other reaction(s): Vomiting    Penicillins      \"Reaction when I had the mumps.\" Reports paralysis on R-side of body, \"couldn't walk for a week.\" Reports that \"to be safe\" doctor advised to avoid in future.    Seasonal Allergies         Social History     Tobacco Use    Smoking status: Never    Smokeless tobacco: Never   Substance Use Topics    Alcohol use: Yes     Comment: wine 3 x month     Family History   Problem Relation Age of Onset    Hypertension Mother     Cancer Mother         skin cancer-melanoma    Cardiovascular Father     Eye Disorder Father         glaucoma    Diabetes Brother     Diabetes Sister     Diabetes Sister      History   Drug Use No         Objective     /78   Pulse 60   Temp 98  F (36.7  C)   Wt 87.5 kg (193 lb)   SpO2 100%   BMI 27.69 kg/m      Physical Exam    GENERAL APPEARANCE: healthy, alert and no distress     EYES: Ocular examination deferred " to the expertise of the patient's ophthalmologist.     HENT: ear canals and TM's normal and nose and mouth without ulcers or lesions     NECK: no adenopathy, no asymmetry, masses, or scars and thyroid normal to palpation     RESP: lungs clear to auscultation - no rales, rhonchi or wheezes     CV: regular rates and rhythm, normal S1 S2, no S3 or S4 and no murmur, click or rub     ABDOMEN:  soft, nontender, no HSM or masses and bowel sounds normal     MS: extremities normal- no gross deformities noted, no evidence of inflammation in joints, FROM in all extremities.     SKIN: no suspicious lesions or rashes     NEURO: Normal strength and tone, sensory exam grossly normal, mentation intact and speech normal     PSYCH: mentation appears normal. and affect normal/bright     LYMPHATICS: No cervical adenopathy    Recent Labs   Lab Test 09/13/22  1338   A1C 5.6        Diagnostics:  No labs were ordered during this visit.   No EKG required for low risk surgery (cataract, skin procedure, breast biopsy, etc).    Revised Cardiac Risk Index (RCRI):  The patient has the following serious cardiovascular risks for perioperative complications:   - No serious cardiac risks = 0 points     RCRI Interpretation: 0 points: Class I (very low risk - 0.4% complication rate)         Signed Electronically by: Jayant Rascon DO  Copy of this evaluation report is provided to requesting physician.

## 2023-07-26 NOTE — PROGRESS NOTES
[address] [address]  Phone: [phone]  Primary Provider: Kadeem You  Pre-op Performing Provider: BEVERLEY NIEVES      PREOPERATIVE EVALUATION:  Today's date: 7/26/2023    Kaz Moralez is a 58 year old male who presents for a preoperative evaluation.      7/26/2023     9:34 AM   Additional Questions   Roomed by Britany crawley     Surgical Information:  Surgery/Procedure: Cataract   Surgery Location: [facility]   Surgeon: Dr Hampton   Surgery Date: 7/27/2023 Right eye  8/3/2023 Left Eye   Time of Surgery:   Where patient plans to recover: At home with family  Fax number for surgical facility: [phone]    Assessment & Plan     The proposed surgical procedure is considered LOW risk.    Age-related cataract of both eyes, unspecified age-related cataract type              - No identified additional risk factors other than previously addressed    Antiplatelet or Anticoagulation Medication Instructions:   - Patient is on no antiplatelet or anticoagulation medications.    Additional Medication Instructions:  Patient is on no additional chronic medications    RECOMMENDATION:  APPROVAL GIVEN to proceed with proposed procedure, without further diagnostic evaluation.          Subjective       HPI related to upcoming procedure: Patient has bilateral cataracts which are now interfering with his ability Tarah like to use of daily living.        7/19/2023     9:28 AM   Preop Questions   1. Have you ever had a heart attack or stroke? No   2. Have you ever had surgery on your heart or blood vessels, such as a stent placement, a coronary artery bypass, or surgery on an artery in your head, neck, heart, or legs? No   3. Do you have chest pain with activity? No   4. Do you have a history of  heart failure? No   5. Do you currently have a cold, bronchitis or symptoms of other infection? No   6. Do you have a cough, shortness of breath, or  wheezing? No   7. Do you or anyone in your family have previous history of blood clots? No   8. Do you or does anyone in your family have a serious bleeding problem such as prolonged bleeding following surgeries or cuts? No   9. Have you ever had problems with anemia or been told to take iron pills? No   10. Have you had any abnormal blood loss such as black, tarry or bloody stools? No   11. Have you ever had a blood transfusion? No   12. Are you willing to have a blood transfusion if it is medically needed before, during, or after your surgery? Yes   13. Have you or any of your relatives ever had problems with anesthesia? YES -    14. Do you have sleep apnea, excessive snoring or daytime drowsiness? No   15. Do you have any artifical heart valves or other implanted medical devices like a pacemaker, defibrillator, or continuous glucose monitor? No   16. Do you have artificial joints? No   17. Are you allergic to latex? No       Health Care Directive:  Patient does not have a Health Care Directive or Living Will: Discussed advance care planning with patient; however, patient declined at this time.    Preoperative Review of :   reviewed - no record of controlled substances prescribed.          Review of Systems  CONSTITUTIONAL: NEGATIVE for fever, chills, change in weight  INTEGUMENTARY/SKIN: NEGATIVE for worrisome rashes, moles or lesions  EYES: Decreased visual acuity consistent with the presence of bilateral cataract.  ENT/MOUTH: NEGATIVE for ear, mouth and throat problems  RESP: NEGATIVE for significant cough or SOB  CV: NEGATIVE for chest pain, palpitations or peripheral edema  GI: NEGATIVE for nausea, abdominal pain, heartburn, or change in bowel habits  : NEGATIVE for frequency, dysuria, or hematuria  MUSCULOSKELETAL: NEGATIVE for significant arthralgias or myalgia  NEURO: NEGATIVE for weakness, dizziness or paresthesias  ENDOCRINE: NEGATIVE for temperature intolerance, skin/hair changes  HEME: NEGATIVE  "for bleeding problems  PSYCHIATRIC: NEGATIVE for changes in mood or affect    Patient Active Problem List    Diagnosis Date Noted    History of nonmelanoma skin cancer 07/10/2020     Priority: Medium    Glaucoma      Priority: Medium     Glaucoma  Problem list name updated by automated process. Provider to review      CARDIOVASCULAR SCREENING; LDL GOAL LESS THAN 160 10/31/2010     Priority: Medium      Past Medical History:   Diagnosis Date    History of nonmelanoma skin cancer     Unspecified glaucoma(365.9)     Glaucoma     No past surgical history on file.  Current Outpatient Medications   Medication Sig Dispense Refill    Glucos-Chondroit-Hyaluron-MSM (GLUCOSAMINE CHONDROITIN JOINT) TABS Take by mouth daily      timolol (TIMOPTIC) 0.25 % ophthalmic solution Place 1 drop into both eyes every morning  0    alfuzosin ER (UROXATRAL) 10 MG 24 hr tablet   0    ciprofloxacin (CIPRO) 500 MG tablet Take 1 tablet (500 mg) by mouth 2 times daily 40 tablet 0       Allergies   Allergen Reactions    Codeine Nausea and Vomiting    Morphine      Other reaction(s): Vomiting    Penicillins      \"Reaction when I had the mumps.\" Reports paralysis on R-side of body, \"couldn't walk for a week.\" Reports that \"to be safe\" doctor advised to avoid in future.    Seasonal Allergies         Social History     Tobacco Use    Smoking status: Never    Smokeless tobacco: Never   Substance Use Topics    Alcohol use: Yes     Comment: wine 3 x month     Family History   Problem Relation Age of Onset    Hypertension Mother     Cancer Mother         skin cancer-melanoma    Cardiovascular Father     Eye Disorder Father         glaucoma    Diabetes Brother     Diabetes Sister     Diabetes Sister      History   Drug Use No         Objective     /78   Pulse 60   Temp 98  F (36.7  C)   Wt 87.5 kg (193 lb)   SpO2 100%   BMI 27.69 kg/m      Physical Exam    GENERAL APPEARANCE: healthy, alert and no distress     EYES: Ocular examination deferred " to the expertise of the patient's ophthalmologist.     HENT: ear canals and TM's normal and nose and mouth without ulcers or lesions     NECK: no adenopathy, no asymmetry, masses, or scars and thyroid normal to palpation     RESP: lungs clear to auscultation - no rales, rhonchi or wheezes     CV: regular rates and rhythm, normal S1 S2, no S3 or S4 and no murmur, click or rub     ABDOMEN:  soft, nontender, no HSM or masses and bowel sounds normal     MS: extremities normal- no gross deformities noted, no evidence of inflammation in joints, FROM in all extremities.     SKIN: no suspicious lesions or rashes     NEURO: Normal strength and tone, sensory exam grossly normal, mentation intact and speech normal     PSYCH: mentation appears normal. and affect normal/bright     LYMPHATICS: No cervical adenopathy    Recent Labs   Lab Test 09/13/22  1338   A1C 5.6        Diagnostics:  No labs were ordered during this visit.   No EKG required for low risk surgery (cataract, skin procedure, breast biopsy, etc).    Revised Cardiac Risk Index (RCRI):  The patient has the following serious cardiovascular risks for perioperative complications:   - No serious cardiac risks = 0 points     RCRI Interpretation: 0 points: Class I (very low risk - 0.4% complication rate)         Signed Electronically by: Jayant Rascon DO  Copy of this evaluation report is provided to requesting physician.

## 2023-12-03 ENCOUNTER — HEALTH MAINTENANCE LETTER (OUTPATIENT)
Age: 58
End: 2023-12-03

## 2025-01-05 ENCOUNTER — HEALTH MAINTENANCE LETTER (OUTPATIENT)
Age: 60
End: 2025-01-05